# Patient Record
Sex: MALE | Race: WHITE | NOT HISPANIC OR LATINO | Employment: UNEMPLOYED | URBAN - METROPOLITAN AREA
[De-identification: names, ages, dates, MRNs, and addresses within clinical notes are randomized per-mention and may not be internally consistent; named-entity substitution may affect disease eponyms.]

---

## 2023-04-05 PROBLEM — J45.20 INTRINSIC ASTHMA WITHOUT STATUS ASTHMATICUS (HHS-HCC): Status: ACTIVE | Noted: 2018-03-23

## 2023-04-05 PROBLEM — E03.9 HYPOTHYROIDISM: Status: ACTIVE | Noted: 2019-04-09

## 2023-04-05 PROBLEM — Q05.9 SPINA BIFIDA (MULTI): Status: ACTIVE | Noted: 2021-06-10

## 2023-04-05 PROBLEM — K21.9 GASTROESOPHAGEAL REFLUX DISEASE: Status: ACTIVE | Noted: 2019-04-09

## 2023-04-06 ENCOUNTER — OFFICE VISIT (OUTPATIENT)
Dept: PRIMARY CARE | Facility: CLINIC | Age: 28
End: 2023-04-06
Payer: COMMERCIAL

## 2023-04-06 VITALS — SYSTOLIC BLOOD PRESSURE: 136 MMHG | HEART RATE: 76 BPM | DIASTOLIC BLOOD PRESSURE: 82 MMHG | TEMPERATURE: 97.2 F

## 2023-04-06 DIAGNOSIS — N31.9 NEUROGENIC DYSFUNCTION OF THE URINARY BLADDER: ICD-10-CM

## 2023-04-06 DIAGNOSIS — Q05.9 SPINA BIFIDA WITHOUT HYDROCEPHALUS, UNSPECIFIED SPINAL REGION (MULTI): Primary | ICD-10-CM

## 2023-04-06 PROBLEM — E11.319 ADVANCED DIABETIC RETINAL DISEASE (MULTI): Status: ACTIVE | Noted: 2023-04-06

## 2023-04-06 PROBLEM — Q05.8: Status: ACTIVE | Noted: 2023-04-06

## 2023-04-06 PROBLEM — N45.3 EPIDIDYMOORCHITIS: Status: ACTIVE | Noted: 2023-04-06

## 2023-04-06 PROCEDURE — 3075F SYST BP GE 130 - 139MM HG: CPT | Performed by: PHYSICIAN ASSISTANT

## 2023-04-06 PROCEDURE — 99214 OFFICE O/P EST MOD 30 MIN: CPT | Performed by: PHYSICIAN ASSISTANT

## 2023-04-06 PROCEDURE — 3079F DIAST BP 80-89 MM HG: CPT | Performed by: PHYSICIAN ASSISTANT

## 2023-04-06 PROCEDURE — 3008F BODY MASS INDEX DOCD: CPT | Performed by: PHYSICIAN ASSISTANT

## 2023-04-06 ASSESSMENT — ENCOUNTER SYMPTOMS
PALPITATIONS: 0
SHORTNESS OF BREATH: 0
ABDOMINAL PAIN: 0

## 2023-04-06 ASSESSMENT — PATIENT HEALTH QUESTIONNAIRE - PHQ9
1. LITTLE INTEREST OR PLEASURE IN DOING THINGS: NOT AT ALL
2. FEELING DOWN, DEPRESSED OR HOPELESS: NOT AT ALL
SUM OF ALL RESPONSES TO PHQ9 QUESTIONS 1 AND 2: 0

## 2023-04-06 NOTE — PROGRESS NOTES
Subjective   Patient ID: Alexis Ruiz is a 27 y.o. male who presents for Annual Exam (Needs medical supplies).    HPI     Patient presents for recheck of spina bifida and neurogenic bladder.      Here with his dad for recheck of congenital spina bifida and neurogenic bladder. Needs recertification for urinary supplies. Sees urologist Dr Stanley but hasn't been there since 11/2021.  Has had no changes. Still uses Gutierrez catheter with a leg bag 24/7. Since he is active and having to transport himself to and from his wheelchair he has a lot of the catheter tubes or bags break. Uses 14 bags and 4 catheters per month (as well as insertion supply trays) --father states that they know this exceeds insurance quantities but they just cash-pay the difference. Also needs supplies for changing them including syringe for filling catheter balloons. Father states that he will have orders sent from [employer] and today's OV notes can be used for auth of medical necessity through [employer]. Also needs recertification of need for disposable pull-on briefs incontinence pads and underpads (states they come from another company) - still good on supplies of these for now.  Uses these briefs/pads since he still leaks some urine.     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 Was seeing orthopedic Dr Guillaume and felt to be well healed. Was working with [employer] but decided to not get a prosthetic.     Was seeing wound clinic last  Fall for a pressure ulcer on buttock but patient states that that has healed.     Not on any medications -- refuses to take any.     No problems with his asthma or allergies in recent years.     Smoking cigs 1ppd.   Feels mood is doing well.   Review of Systems   Respiratory:  Negative for shortness of breath.    Cardiovascular:  Negative for chest pain and palpitations.   Gastrointestinal:  Negative for abdominal pain.       Objective   /82   Pulse 76   Temp 36.2 °C (97.2 °F)      Physical Exam  Constitutional:       Comments: In wheelchair. Partial right leg amputation.    HENT:      Head: Normocephalic.   Eyes:      General: No scleral icterus.  Cardiovascular:      Rate and Rhythm: Normal rate and regular rhythm.   Pulmonary:      Effort: Pulmonary effort is normal.      Breath sounds: Normal breath sounds.   Abdominal:      Palpations: Abdomen is soft. There is no mass.      Tenderness: There is no abdominal tenderness.   Skin:     General: Skin is warm and dry.   Neurological:      Mental Status: He is alert.   Psychiatric:         Mood and Affect: Affect normal.         Assessment/Plan   Diagnoses and all orders for this visit:  Spina bifida without hydrocephalus, unspecified spinal region (CMS/HCC)  Neurogenic dysfunction of the urinary bladder       It is medically necessary for patient to have incontinence supplies and anaya catheter supplies due to chronic hx of neurogenic bladder.   Father will request that [employer] send us their order form and will send back completed order. Encouraged to discontinue smoking. Stay active. Follow up as needed

## 2023-05-09 ENCOUNTER — TELEPHONE (OUTPATIENT)
Dept: PRIMARY CARE | Facility: CLINIC | Age: 28
End: 2023-05-09
Payer: COMMERCIAL

## 2023-05-09 ENCOUNTER — DOCUMENTATION (OUTPATIENT)
Dept: PRIMARY CARE | Facility: CLINIC | Age: 28
End: 2023-05-09
Payer: COMMERCIAL

## 2023-05-09 NOTE — TELEPHONE ENCOUNTER
----- Message from Abdoulaye Rivera RN sent at 5/9/2023 12:37 PM EDT -----  Regarding: unable to reach patient after 2 attempts  Discharge facility: [facility] diagnosis: Wound Check/AMA  Date of discharge:  8 May 23      Unsuccessful attempts x2 to reach patient for PCP Follow-up  Please have office staff reach out to patient and schedule an appointment within 7-13 days from discharge date.

## 2023-05-18 ENCOUNTER — APPOINTMENT (OUTPATIENT)
Dept: PRIMARY CARE | Facility: CLINIC | Age: 28
End: 2023-05-18
Payer: COMMERCIAL

## 2023-05-18 ASSESSMENT — ENCOUNTER SYMPTOMS
SHORTNESS OF BREATH: 0
PALPITATIONS: 0
ABDOMINAL PAIN: 0

## 2023-05-18 NOTE — PROGRESS NOTES
Subjective   Patient ID: Alexis Ruiz is a 27 y.o. male who presents for No chief complaint on file..    HPI   Patient presents for hospital follow up.    Admission date: 4/6/22  Discharge date:  4/6/22  Discharge diagnosis: abscess, Left ischial pressure sore, stage IV   Telephone outreach with patient after discharge: ***  Face-to-Face visit date: 05/18/23   Medical complexity decision-making: moderate  Reviewed discharge summary, lab/test results, and hospital records.  Reconciled med list.   Specialist follow-ups? ***            TCM complete: 9 May 23   Patient is NOT scheduled for a hospital follow up due to unsuccessful attempts to reach patient, task to office.   Patient discharged (8 May 23).   In order to bill as a TCM, the patient needs to be seen by (26 May 23).     Moderately complex & follow-up within 14 days- bill [id number] for hospital follow up.   Highly complex and follow-up visit within 7 days-can bill [id number] for hospital follow up     Review of Systems   Respiratory:  Negative for shortness of breath.    Cardiovascular:  Negative for chest pain and palpitations.   Gastrointestinal:  Negative for abdominal pain.         Objective   There were no vitals taken for this visit.    Physical Exam  Vitals and nursing note reviewed.   Constitutional:       Appearance: Normal appearance. He is well-developed.   Eyes:      General: No scleral icterus.  Cardiovascular:      Rate and Rhythm: Normal rate and regular rhythm.      Heart sounds: No murmur heard.  Pulmonary:      Effort: Pulmonary effort is normal.      Breath sounds: Normal breath sounds.   Abdominal:      Palpations: Abdomen is soft. There is no mass.      Tenderness: There is no abdominal tenderness.   Musculoskeletal:      Cervical back: Neck supple.   Skin:     General: Skin is warm and dry.   Neurological:      Mental Status: He is alert.           Assessment/Plan   There are no diagnoses linked to this encounter.

## 2023-05-20 ENCOUNTER — HOSPITAL ENCOUNTER (INPATIENT)
Age: 28
LOS: 1 days | Discharge: HOME HEALTH | DRG: 602 | End: 2023-05-24
Attending: EMERGENCY MEDICINE | Admitting: INTERNAL MEDICINE
Payer: MEDICARE

## 2023-05-20 DIAGNOSIS — L02.31 GLUTEAL ABSCESS: ICD-10-CM

## 2023-05-20 DIAGNOSIS — L89.323 PRESSURE INJURY OF LEFT BUTTOCK, STAGE 3 (HCC): Primary | ICD-10-CM

## 2023-05-20 DIAGNOSIS — T14.8XXA WOUND, OPEN: ICD-10-CM

## 2023-05-20 PROCEDURE — 96367 TX/PROPH/DG ADDL SEQ IV INF: CPT

## 2023-05-20 PROCEDURE — 96365 THER/PROPH/DIAG IV INF INIT: CPT

## 2023-05-20 PROCEDURE — 87040 BLOOD CULTURE FOR BACTERIA: CPT

## 2023-05-20 PROCEDURE — 83605 ASSAY OF LACTIC ACID: CPT

## 2023-05-20 PROCEDURE — 85651 RBC SED RATE NONAUTOMATED: CPT

## 2023-05-20 PROCEDURE — 87186 SC STD MICRODIL/AGAR DIL: CPT

## 2023-05-20 PROCEDURE — 85025 COMPLETE CBC W/AUTO DIFF WBC: CPT

## 2023-05-20 PROCEDURE — 87075 CULTR BACTERIA EXCEPT BLOOD: CPT

## 2023-05-20 PROCEDURE — 80053 COMPREHEN METABOLIC PANEL: CPT

## 2023-05-20 PROCEDURE — 87070 CULTURE OTHR SPECIMN AEROBIC: CPT

## 2023-05-20 PROCEDURE — 86140 C-REACTIVE PROTEIN: CPT

## 2023-05-20 PROCEDURE — 99285 EMERGENCY DEPT VISIT HI MDM: CPT

## 2023-05-20 PROCEDURE — 87077 CULTURE AEROBIC IDENTIFY: CPT

## 2023-05-20 RX ORDER — SODIUM CHLORIDE 9 MG/ML
INJECTION, SOLUTION INTRAVENOUS PRN
Status: DISCONTINUED | OUTPATIENT
Start: 2023-05-20 | End: 2023-05-24 | Stop reason: HOSPADM

## 2023-05-20 RX ORDER — SODIUM CHLORIDE 0.9 % (FLUSH) 0.9 %
5-40 SYRINGE (ML) INJECTION EVERY 12 HOURS SCHEDULED
Status: DISCONTINUED | OUTPATIENT
Start: 2023-05-20 | End: 2023-05-24 | Stop reason: HOSPADM

## 2023-05-20 RX ORDER — SODIUM CHLORIDE 0.9 % (FLUSH) 0.9 %
5-40 SYRINGE (ML) INJECTION PRN
Status: DISCONTINUED | OUTPATIENT
Start: 2023-05-20 | End: 2023-05-24 | Stop reason: HOSPADM

## 2023-05-20 ASSESSMENT — LIFESTYLE VARIABLES
HOW MANY STANDARD DRINKS CONTAINING ALCOHOL DO YOU HAVE ON A TYPICAL DAY: PATIENT DOES NOT DRINK
HOW OFTEN DO YOU HAVE A DRINK CONTAINING ALCOHOL: NEVER

## 2023-05-20 ASSESSMENT — PAIN - FUNCTIONAL ASSESSMENT: PAIN_FUNCTIONAL_ASSESSMENT: NONE - DENIES PAIN

## 2023-05-21 ENCOUNTER — APPOINTMENT (OUTPATIENT)
Dept: CT IMAGING | Age: 28
DRG: 602 | End: 2023-05-21
Payer: MEDICARE

## 2023-05-21 PROBLEM — F90.2 ATTENTION DEFICIT HYPERACTIVITY DISORDER (ADHD), COMBINED TYPE: Status: ACTIVE | Noted: 2023-05-21

## 2023-05-21 PROBLEM — Z97.8 CHRONIC INDWELLING FOLEY CATHETER: Status: ACTIVE | Noted: 2023-05-21

## 2023-05-21 PROBLEM — N31.9 NEUROGENIC BLADDER: Status: ACTIVE | Noted: 2023-05-21

## 2023-05-21 PROBLEM — T14.8XXA WOUND, OPEN: Status: ACTIVE | Noted: 2023-05-21

## 2023-05-21 PROBLEM — K59.2 NEUROGENIC BOWEL: Status: ACTIVE | Noted: 2023-05-21

## 2023-05-21 PROBLEM — E03.9 ACQUIRED HYPOTHYROIDISM: Status: ACTIVE | Noted: 2023-05-21

## 2023-05-21 PROBLEM — L89.323 PRESSURE INJURY OF LEFT BUTTOCK, STAGE 3 (HCC): Status: ACTIVE | Noted: 2023-05-21

## 2023-05-21 PROBLEM — Q05.7 SPINA BIFIDA OF LUMBAR REGION WITHOUT HYDROCEPHALUS (HCC): Status: ACTIVE | Noted: 2023-05-21

## 2023-05-21 PROBLEM — F31.70 BIPOLAR AFFECTIVE DISORDER IN REMISSION (HCC): Status: ACTIVE | Noted: 2023-05-21

## 2023-05-21 PROBLEM — K59.09 OTHER CONSTIPATION: Status: ACTIVE | Noted: 2023-05-21

## 2023-05-21 PROBLEM — L89.324 PRESSURE INJURY OF LEFT BUTTOCK, STAGE 4 (HCC): Status: ACTIVE | Noted: 2023-05-21

## 2023-05-21 LAB
ALBUMIN SERPL-MCNC: 3.8 G/DL (ref 3.5–5.2)
ALP SERPL-CCNC: 80 U/L (ref 40–129)
ALT SERPL-CCNC: 7 U/L (ref 0–40)
ANION GAP SERPL CALCULATED.3IONS-SCNC: 10 MMOL/L (ref 7–16)
AST SERPL-CCNC: 12 U/L (ref 0–39)
BACTERIA URNS QL MICRO: ABNORMAL /HPF
BASOPHILS # BLD: 0.12 E9/L (ref 0–0.2)
BASOPHILS NFR BLD: 1.3 % (ref 0–2)
BILIRUB SERPL-MCNC: <0.2 MG/DL (ref 0–1.2)
BILIRUB UR QL STRIP: NEGATIVE
BUN SERPL-MCNC: 8 MG/DL (ref 6–20)
CALCIUM SERPL-MCNC: 9.4 MG/DL (ref 8.6–10.2)
CHLORIDE SERPL-SCNC: 105 MMOL/L (ref 98–107)
CLARITY UR: ABNORMAL
CO2 SERPL-SCNC: 29 MMOL/L (ref 22–29)
COLOR UR: YELLOW
CREAT SERPL-MCNC: 0.7 MG/DL (ref 0.7–1.2)
CRP SERPL HS-MCNC: 1.4 MG/DL (ref 0–0.4)
EOSINOPHIL # BLD: 0.26 E9/L (ref 0.05–0.5)
EOSINOPHIL NFR BLD: 2.9 % (ref 0–6)
EPI CELLS #/AREA URNS HPF: ABNORMAL /HPF
ERYTHROCYTE [DISTWIDTH] IN BLOOD BY AUTOMATED COUNT: 14.6 FL (ref 11.5–15)
ERYTHROCYTE [SEDIMENTATION RATE] IN BLOOD BY WESTERGREN METHOD: 27 MM/HR (ref 0–15)
GLUCOSE SERPL-MCNC: 79 MG/DL (ref 74–99)
GLUCOSE UR STRIP-MCNC: NEGATIVE MG/DL
HCT VFR BLD AUTO: 45.7 % (ref 37–54)
HGB BLD-MCNC: 13.9 G/DL (ref 12.5–16.5)
HGB UR QL STRIP: ABNORMAL
IMM GRANULOCYTES # BLD: 0.05 E9/L
IMM GRANULOCYTES NFR BLD: 0.6 % (ref 0–5)
KETONES UR STRIP-MCNC: NEGATIVE MG/DL
LACTATE BLDV-SCNC: 1.9 MMOL/L (ref 0.5–1.9)
LEUKOCYTE ESTERASE UR QL STRIP: ABNORMAL
LYMPHOCYTES # BLD: 1.87 E9/L (ref 1.5–4)
LYMPHOCYTES NFR BLD: 20.8 % (ref 20–42)
MCH RBC QN AUTO: 23.6 PG (ref 26–35)
MCHC RBC AUTO-ENTMCNC: 30.4 % (ref 32–34.5)
MCV RBC AUTO: 77.5 FL (ref 80–99.9)
MONOCYTES # BLD: 0.51 E9/L (ref 0.1–0.95)
MONOCYTES NFR BLD: 5.7 % (ref 2–12)
NEUTROPHILS # BLD: 6.18 E9/L (ref 1.8–7.3)
NEUTS SEG NFR BLD: 68.7 % (ref 43–80)
NITRITE UR QL STRIP: POSITIVE
PH UR STRIP: 6 [PH] (ref 5–9)
PLATELET # BLD AUTO: 576 E9/L (ref 130–450)
PMV BLD AUTO: 8.9 FL (ref 7–12)
POTASSIUM SERPL-SCNC: 4.4 MMOL/L (ref 3.5–5)
PROT SERPL-MCNC: 7.7 G/DL (ref 6.4–8.3)
PROT UR STRIP-MCNC: 30 MG/DL
RBC # BLD AUTO: 5.9 E12/L (ref 3.8–5.8)
RBC #/AREA URNS HPF: ABNORMAL /HPF (ref 0–2)
SODIUM SERPL-SCNC: 144 MMOL/L (ref 132–146)
SP GR UR STRIP: >=1.03 (ref 1–1.03)
UROBILINOGEN UR STRIP-ACNC: 0.2 E.U./DL
WBC # BLD: 9 E9/L (ref 4.5–11.5)
WBC #/AREA URNS HPF: ABNORMAL /HPF (ref 0–5)

## 2023-05-21 PROCEDURE — G0378 HOSPITAL OBSERVATION PER HR: HCPCS

## 2023-05-21 PROCEDURE — 99221 1ST HOSP IP/OBS SF/LOW 40: CPT | Performed by: SURGERY

## 2023-05-21 PROCEDURE — 2580000003 HC RX 258: Performed by: FAMILY MEDICINE

## 2023-05-21 PROCEDURE — 81001 URINALYSIS AUTO W/SCOPE: CPT

## 2023-05-21 PROCEDURE — 2580000003 HC RX 258: Performed by: STUDENT IN AN ORGANIZED HEALTH CARE EDUCATION/TRAINING PROGRAM

## 2023-05-21 PROCEDURE — 2580000003 HC RX 258: Performed by: SPECIALIST

## 2023-05-21 PROCEDURE — 2500000003 HC RX 250 WO HCPCS: Performed by: STUDENT IN AN ORGANIZED HEALTH CARE EDUCATION/TRAINING PROGRAM

## 2023-05-21 PROCEDURE — 6360000004 HC RX CONTRAST MEDICATION: Performed by: RADIOLOGY

## 2023-05-21 PROCEDURE — 6360000002 HC RX W HCPCS: Performed by: STUDENT IN AN ORGANIZED HEALTH CARE EDUCATION/TRAINING PROGRAM

## 2023-05-21 PROCEDURE — 74177 CT ABD & PELVIS W/CONTRAST: CPT

## 2023-05-21 PROCEDURE — 99222 1ST HOSP IP/OBS MODERATE 55: CPT | Performed by: FAMILY MEDICINE

## 2023-05-21 PROCEDURE — 6360000002 HC RX W HCPCS: Performed by: SPECIALIST

## 2023-05-21 RX ORDER — SODIUM CHLORIDE 9 MG/ML
INJECTION, SOLUTION INTRAVENOUS PRN
Status: DISCONTINUED | OUTPATIENT
Start: 2023-05-21 | End: 2023-05-24 | Stop reason: HOSPADM

## 2023-05-21 RX ORDER — CLINDAMYCIN PHOSPHATE 900 MG/50ML
900 INJECTION INTRAVENOUS ONCE
Status: COMPLETED | OUTPATIENT
Start: 2023-05-21 | End: 2023-05-21

## 2023-05-21 RX ORDER — SODIUM CHLORIDE 0.9 % (FLUSH) 0.9 %
5-40 SYRINGE (ML) INJECTION PRN
Status: DISCONTINUED | OUTPATIENT
Start: 2023-05-21 | End: 2023-05-24 | Stop reason: HOSPADM

## 2023-05-21 RX ORDER — ACETAMINOPHEN 650 MG/1
650 SUPPOSITORY RECTAL EVERY 6 HOURS PRN
Status: DISCONTINUED | OUTPATIENT
Start: 2023-05-21 | End: 2023-05-24 | Stop reason: HOSPADM

## 2023-05-21 RX ORDER — SODIUM CHLORIDE 0.9 % (FLUSH) 0.9 %
5-40 SYRINGE (ML) INJECTION EVERY 12 HOURS SCHEDULED
Status: DISCONTINUED | OUTPATIENT
Start: 2023-05-21 | End: 2023-05-24 | Stop reason: HOSPADM

## 2023-05-21 RX ORDER — BISACODYL 10 MG
10 SUPPOSITORY, RECTAL RECTAL PRN
Status: DISCONTINUED | OUTPATIENT
Start: 2023-05-21 | End: 2023-05-24 | Stop reason: HOSPADM

## 2023-05-21 RX ORDER — 0.9 % SODIUM CHLORIDE 0.9 %
1000 INTRAVENOUS SOLUTION INTRAVENOUS ONCE
Status: COMPLETED | OUTPATIENT
Start: 2023-05-21 | End: 2023-05-21

## 2023-05-21 RX ORDER — ACETAMINOPHEN 325 MG/1
650 TABLET ORAL EVERY 6 HOURS PRN
Status: DISCONTINUED | OUTPATIENT
Start: 2023-05-21 | End: 2023-05-24 | Stop reason: HOSPADM

## 2023-05-21 RX ADMIN — SODIUM CHLORIDE 1000 ML: 9 INJECTION, SOLUTION INTRAVENOUS at 03:02

## 2023-05-21 RX ADMIN — IOPAMIDOL 75 ML: 755 INJECTION, SOLUTION INTRAVENOUS at 01:03

## 2023-05-21 RX ADMIN — SODIUM CHLORIDE, PRESERVATIVE FREE 10 ML: 5 INJECTION INTRAVENOUS at 20:12

## 2023-05-21 RX ADMIN — Medication 5 ML: at 00:10

## 2023-05-21 RX ADMIN — CLINDAMYCIN PHOSPHATE 900 MG: 900 INJECTION, SOLUTION INTRAVENOUS at 03:45

## 2023-05-21 RX ADMIN — PIPERACILLIN AND TAZOBACTAM 4500 MG: 4; .5 INJECTION, POWDER, LYOPHILIZED, FOR SOLUTION INTRAVENOUS at 03:03

## 2023-05-21 RX ADMIN — PIPERACILLIN AND TAZOBACTAM 3375 MG: 3; .375 INJECTION, POWDER, LYOPHILIZED, FOR SOLUTION INTRAVENOUS at 20:16

## 2023-05-21 RX ADMIN — VANCOMYCIN HYDROCHLORIDE 1500 MG: 10 INJECTION, POWDER, LYOPHILIZED, FOR SOLUTION INTRAVENOUS at 04:56

## 2023-05-21 RX ADMIN — PIPERACILLIN AND TAZOBACTAM 3375 MG: 3; .375 INJECTION, POWDER, LYOPHILIZED, FOR SOLUTION INTRAVENOUS at 14:08

## 2023-05-22 LAB
ANION GAP SERPL CALCULATED.3IONS-SCNC: 9 MMOL/L (ref 7–16)
BASOPHILS # BLD: 0.11 E9/L (ref 0–0.2)
BASOPHILS NFR BLD: 1.4 % (ref 0–2)
BUN SERPL-MCNC: 8 MG/DL (ref 6–20)
CALCIUM SERPL-MCNC: 9.2 MG/DL (ref 8.6–10.2)
CHLORIDE SERPL-SCNC: 103 MMOL/L (ref 98–107)
CO2 SERPL-SCNC: 28 MMOL/L (ref 22–29)
CREAT SERPL-MCNC: 0.7 MG/DL (ref 0.7–1.2)
EOSINOPHIL # BLD: 0.43 E9/L (ref 0.05–0.5)
EOSINOPHIL NFR BLD: 5.6 % (ref 0–6)
ERYTHROCYTE [DISTWIDTH] IN BLOOD BY AUTOMATED COUNT: 14.6 FL (ref 11.5–15)
GLUCOSE SERPL-MCNC: 73 MG/DL (ref 74–99)
HCT VFR BLD AUTO: 42.7 % (ref 37–54)
HGB BLD-MCNC: 12.7 G/DL (ref 12.5–16.5)
IMM GRANULOCYTES # BLD: 0.04 E9/L
IMM GRANULOCYTES NFR BLD: 0.5 % (ref 0–5)
LYMPHOCYTES # BLD: 2.62 E9/L (ref 1.5–4)
LYMPHOCYTES NFR BLD: 34.4 % (ref 20–42)
MCH RBC QN AUTO: 23.7 PG (ref 26–35)
MCHC RBC AUTO-ENTMCNC: 29.7 % (ref 32–34.5)
MCV RBC AUTO: 79.7 FL (ref 80–99.9)
MONOCYTES # BLD: 0.59 E9/L (ref 0.1–0.95)
MONOCYTES NFR BLD: 7.7 % (ref 2–12)
NEUTROPHILS # BLD: 3.83 E9/L (ref 1.8–7.3)
NEUTS SEG NFR BLD: 50.4 % (ref 43–80)
PLATELET # BLD AUTO: 375 E9/L (ref 130–450)
PMV BLD AUTO: 10.7 FL (ref 7–12)
POTASSIUM SERPL-SCNC: 4.1 MMOL/L (ref 3.5–5)
RBC # BLD AUTO: 5.36 E12/L (ref 3.8–5.8)
SODIUM SERPL-SCNC: 140 MMOL/L (ref 132–146)
WBC # BLD: 7.6 E9/L (ref 4.5–11.5)

## 2023-05-22 PROCEDURE — 85025 COMPLETE CBC W/AUTO DIFF WBC: CPT

## 2023-05-22 PROCEDURE — 2580000003 HC RX 258: Performed by: SPECIALIST

## 2023-05-22 PROCEDURE — 80048 BASIC METABOLIC PNL TOTAL CA: CPT

## 2023-05-22 PROCEDURE — 6360000002 HC RX W HCPCS: Performed by: SPECIALIST

## 2023-05-22 PROCEDURE — 99232 SBSQ HOSP IP/OBS MODERATE 35: CPT | Performed by: INTERNAL MEDICINE

## 2023-05-22 PROCEDURE — 36415 COLL VENOUS BLD VENIPUNCTURE: CPT

## 2023-05-22 PROCEDURE — G0378 HOSPITAL OBSERVATION PER HR: HCPCS

## 2023-05-22 RX ADMIN — PIPERACILLIN AND TAZOBACTAM 3375 MG: 3; .375 INJECTION, POWDER, LYOPHILIZED, FOR SOLUTION INTRAVENOUS at 22:04

## 2023-05-22 RX ADMIN — PIPERACILLIN AND TAZOBACTAM 3375 MG: 3; .375 INJECTION, POWDER, LYOPHILIZED, FOR SOLUTION INTRAVENOUS at 06:40

## 2023-05-22 RX ADMIN — PIPERACILLIN AND TAZOBACTAM 3375 MG: 3; .375 INJECTION, POWDER, LYOPHILIZED, FOR SOLUTION INTRAVENOUS at 14:18

## 2023-05-22 NOTE — PLAN OF CARE
Problem: Discharge Planning  Goal: Discharge to home or other facility with appropriate resources  5/22/2023 0851 by Oma Bae RN  Outcome: Progressing     Problem: Risk for Elopement  Goal: Patient will not exit the unit/facility without proper excort  5/22/2023 0851 by Oma Bae RN  Outcome: Progressing     Problem: Safety - Adult  Goal: Free from fall injury  5/22/2023 0851 by Oma Bae RN  Outcome: Progressing     Problem: ABCDS Injury Assessment  Goal: Absence of physical injury  5/22/2023 0851 by Oma Bae RN  Outcome: Progressing     Problem: Skin/Tissue Integrity  Goal: Absence of new skin breakdown  Description: 1. Monitor for areas of redness and/or skin breakdown  2. Assess vascular access sites hourly  3. Every 4-6 hours minimum:  Change oxygen saturation probe site  4. Every 4-6 hours:  If on nasal continuous positive airway pressure, respiratory therapy assess nares and determine need for appliance change or resting period.   5/22/2023 0851 by Oma Bae RN  Outcome: Progressing     Problem: Skin/Tissue Integrity - Adult  Goal: Incisions, wounds, or drain sites healing without S/S of infection  5/22/2023 0851 by Oma Bae RN  Outcome: Progressing

## 2023-05-22 NOTE — PLAN OF CARE
Problem: Discharge Planning  Goal: Discharge to home or other facility with appropriate resources  5/21/2023 2252 by Lynn Lange RN  Outcome: Progressing     Problem: Risk for Elopement  Goal: Patient will not exit the unit/facility without proper excort  5/21/2023 2252 by Lynn Lange RN  Outcome: Progressing     Problem: Safety - Adult  Goal: Free from fall injury  5/21/2023 2252 by Lynn Lange RN  Outcome: Progressing     Problem: ABCDS Injury Assessment  Goal: Absence of physical injury  5/21/2023 2252 by Lynn Lange RN  Outcome: Progressing     Problem: Skin/Tissue Integrity  Goal: Absence of new skin breakdown  Description: 1. Monitor for areas of redness and/or skin breakdown  2. Assess vascular access sites hourly  3. Every 4-6 hours minimum:  Change oxygen saturation probe site  4. Every 4-6 hours:  If on nasal continuous positive airway pressure, respiratory therapy assess nares and determine need for appliance change or resting period.   5/21/2023 2252 by Lynn Lange RN  Outcome: Progressing     Problem: Skin/Tissue Integrity - Adult  Goal: Incisions, wounds, or drain sites healing without S/S of infection  5/21/2023 2252 by Lynn Lange RN  Outcome: Progressing

## 2023-05-23 LAB — BACTERIA SPEC ANAEROBE CULT: NORMAL

## 2023-05-23 PROCEDURE — G0378 HOSPITAL OBSERVATION PER HR: HCPCS

## 2023-05-23 PROCEDURE — 2580000003 HC RX 258: Performed by: FAMILY MEDICINE

## 2023-05-23 PROCEDURE — 2580000003 HC RX 258: Performed by: STUDENT IN AN ORGANIZED HEALTH CARE EDUCATION/TRAINING PROGRAM

## 2023-05-23 PROCEDURE — 6370000000 HC RX 637 (ALT 250 FOR IP): Performed by: INTERNAL MEDICINE

## 2023-05-23 PROCEDURE — 6360000002 HC RX W HCPCS: Performed by: SPECIALIST

## 2023-05-23 PROCEDURE — 1200000000 HC SEMI PRIVATE

## 2023-05-23 PROCEDURE — 99232 SBSQ HOSP IP/OBS MODERATE 35: CPT | Performed by: INTERNAL MEDICINE

## 2023-05-23 PROCEDURE — 2580000003 HC RX 258: Performed by: SPECIALIST

## 2023-05-23 RX ORDER — NICOTINE 21 MG/24HR
1 PATCH, TRANSDERMAL 24 HOURS TRANSDERMAL DAILY
Status: DISCONTINUED | OUTPATIENT
Start: 2023-05-23 | End: 2023-05-24 | Stop reason: HOSPADM

## 2023-05-23 RX ORDER — CALCIUM CARBONATE 500 MG/1
500 TABLET, CHEWABLE ORAL 3 TIMES DAILY PRN
Status: DISCONTINUED | OUTPATIENT
Start: 2023-05-23 | End: 2023-05-24 | Stop reason: HOSPADM

## 2023-05-23 RX ADMIN — PIPERACILLIN AND TAZOBACTAM 3375 MG: 3; .375 INJECTION, POWDER, LYOPHILIZED, FOR SOLUTION INTRAVENOUS at 14:16

## 2023-05-23 RX ADMIN — PIPERACILLIN AND TAZOBACTAM 3375 MG: 3; .375 INJECTION, POWDER, LYOPHILIZED, FOR SOLUTION INTRAVENOUS at 05:30

## 2023-05-23 RX ADMIN — Medication 10 ML: at 20:59

## 2023-05-23 RX ADMIN — CALCIUM CARBONATE (ANTACID) CHEW TAB 500 MG 500 MG: 500 CHEW TAB at 14:12

## 2023-05-23 RX ADMIN — PIPERACILLIN AND TAZOBACTAM 3375 MG: 3; .375 INJECTION, POWDER, LYOPHILIZED, FOR SOLUTION INTRAVENOUS at 20:58

## 2023-05-23 RX ADMIN — SODIUM CHLORIDE, PRESERVATIVE FREE 10 ML: 5 INJECTION INTRAVENOUS at 20:59

## 2023-05-23 NOTE — PLAN OF CARE
Problem: Discharge Planning  Goal: Discharge to home or other facility with appropriate resources  Outcome: Progressing     Problem: Risk for Elopement  Goal: Patient will not exit the unit/facility without proper excort  Outcome: Progressing     Problem: Safety - Adult  Goal: Free from fall injury  Outcome: Progressing     Problem: ABCDS Injury Assessment  Goal: Absence of physical injury  Outcome: Progressing     Problem: Skin/Tissue Integrity  Goal: Absence of new skin breakdown  Description: 1. Monitor for areas of redness and/or skin breakdown  2. Assess vascular access sites hourly  3. Every 4-6 hours minimum:  Change oxygen saturation probe site  4. Every 4-6 hours:  If on nasal continuous positive airway pressure, respiratory therapy assess nares and determine need for appliance change or resting period.   Outcome: Progressing     Problem: Skin/Tissue Integrity - Adult  Goal: Incisions, wounds, or drain sites healing without S/S of infection  Outcome: Progressing

## 2023-05-24 ENCOUNTER — TELEPHONE (OUTPATIENT)
Dept: PRIMARY CARE | Facility: CLINIC | Age: 28
End: 2023-05-24
Payer: COMMERCIAL

## 2023-05-24 VITALS
OXYGEN SATURATION: 98 % | TEMPERATURE: 98 F | DIASTOLIC BLOOD PRESSURE: 61 MMHG | RESPIRATION RATE: 18 BRPM | BODY MASS INDEX: 22.65 KG/M2 | HEART RATE: 78 BPM | HEIGHT: 74 IN | SYSTOLIC BLOOD PRESSURE: 106 MMHG | WEIGHT: 176.5 LBS

## 2023-05-24 LAB
BACTERIA WND AEROBE CULT: ABNORMAL
BACTERIA WND AEROBE CULT: ABNORMAL
ORGANISM: ABNORMAL
ORGANISM: ABNORMAL

## 2023-05-24 PROCEDURE — 6370000000 HC RX 637 (ALT 250 FOR IP): Performed by: CLINICAL NURSE SPECIALIST

## 2023-05-24 PROCEDURE — 2580000003 HC RX 258: Performed by: SPECIALIST

## 2023-05-24 PROCEDURE — 6370000000 HC RX 637 (ALT 250 FOR IP): Performed by: INTERNAL MEDICINE

## 2023-05-24 PROCEDURE — 6360000002 HC RX W HCPCS: Performed by: SPECIALIST

## 2023-05-24 PROCEDURE — 99232 SBSQ HOSP IP/OBS MODERATE 35: CPT | Performed by: INTERNAL MEDICINE

## 2023-05-24 RX ORDER — CEFDINIR 300 MG/1
300 CAPSULE ORAL EVERY 12 HOURS SCHEDULED
Qty: 42 CAPSULE | Refills: 0 | Status: SHIPPED | OUTPATIENT
Start: 2023-05-24 | End: 2023-06-14

## 2023-05-24 RX ORDER — NICOTINE 21 MG/24HR
1 PATCH, TRANSDERMAL 24 HOURS TRANSDERMAL DAILY
Qty: 30 PATCH | Refills: 0 | Status: SHIPPED | OUTPATIENT
Start: 2023-05-25

## 2023-05-24 RX ORDER — DOXYCYCLINE HYCLATE 100 MG/1
100 CAPSULE ORAL EVERY 12 HOURS SCHEDULED
Status: DISCONTINUED | OUTPATIENT
Start: 2023-05-24 | End: 2023-05-24 | Stop reason: HOSPADM

## 2023-05-24 RX ORDER — CEFDINIR 300 MG/1
300 CAPSULE ORAL EVERY 12 HOURS SCHEDULED
Status: DISCONTINUED | OUTPATIENT
Start: 2023-05-24 | End: 2023-05-24 | Stop reason: HOSPADM

## 2023-05-24 RX ORDER — DOXYCYCLINE HYCLATE 100 MG
100 TABLET ORAL EVERY 12 HOURS SCHEDULED
Qty: 42 TABLET | Refills: 0 | Status: SHIPPED | OUTPATIENT
Start: 2023-05-24 | End: 2023-06-14

## 2023-05-24 RX ADMIN — DOXYCYCLINE HYCLATE 100 MG: 100 CAPSULE ORAL at 12:46

## 2023-05-24 RX ADMIN — PIPERACILLIN AND TAZOBACTAM 3375 MG: 3; .375 INJECTION, POWDER, LYOPHILIZED, FOR SOLUTION INTRAVENOUS at 05:22

## 2023-05-24 RX ADMIN — CEFDINIR 300 MG: 300 CAPSULE ORAL at 12:46

## 2023-05-24 NOTE — PLAN OF CARE
Problem: Safety - Adult  Goal: Free from fall injury  5/24/2023 1242 by Tim Tracy RN  Outcome: Progressing     Problem: Skin/Tissue Integrity  Goal: Absence of new skin breakdown  Description: 1. Monitor for areas of redness and/or skin breakdown  2. Assess vascular access sites hourly  3. Every 4-6 hours minimum:  Change oxygen saturation probe site  4. Every 4-6 hours:  If on nasal continuous positive airway pressure, respiratory therapy assess nares and determine need for appliance change or resting period.   5/24/2023 1242 by Tim Tracy, RN  Outcome: Progressing

## 2023-05-24 NOTE — TELEPHONE ENCOUNTER
Ayala called from [facility] asking if [facility] will follow for Skilled Nursing and HHC? Please advise

## 2023-05-24 NOTE — FLOWSHEET NOTE
Inpatient Wound Care    Admit Date: 5/20/2023 11:25 PM    Reason for consult:  left Ischial right stump    Significant history:    Past Medical History:   Diagnosis Date    Spina bifida ([address]        Wound history:      Findings:     05/23/23 1150   Wound 05/21/23 Ischium Left   Date First Assessed/Time First Assessed: 05/21/23 0643   Present on Hospital Admission: Yes  Primary Wound Type: Pressure Injury  Location: Ischium  Wound Location Orientation: Left   Wound Image    Wound Etiology Pressure Stage 3   Wound Cleansed Irrigated with saline   Dressing/Treatment Alginate   Wound Length (cm) 2.5 cm   Wound Width (cm) 2.5 cm   Wound Surface Area (cm^2) 6.25 cm^2   Change in Wound Size % (l*w) 0   Wound Assessment Pink/red   Drainage Amount Small   Odor None   Clarisa-wound Assessment Intact   Wound 05/24/23 Pretibial Proximal;Right   Date First Assessed/Time First Assessed: 05/24/23 0920   Present on Hospital Admission: Yes  Location: Pretibial  Wound Location Orientation: Proximal;Right   Wound Image    Wound Cleansed Cleansed with saline   Wound Assessment Eschar dry   Drainage Amount None   Odor None       **Informed Consent**    The patient has given verbal consent to have photos taken of right stump  left ischial and inserted into their chart as part of their permanent medical record for purposes of documentation, treatment management and/or medical review. All Images taken on 5/24/23 of patient name: Topher Durbin were transmitted and stored on secured [employer] located within [facility] by a registered [employer] Mobile Application Device.         Impression:  left ischial stage 4right stump covered with eschar    Interventions in place:  left ischial stage 3    Plan:  Left ischial using opticell Helene Siemens, RN 5/24/2023 9:22 AM

## 2023-05-24 NOTE — DISCHARGE SUMMARY
[facility] Physician Discharge Summary       Sue Christie PA-C  [address]  [facility] [phone]    Schedule an appointment as soon as possible for a visit in 2 week(s)      [place] Gonsalez MD  [address] [address] [facility]  [phone]    Schedule an appointment as soon as possible for a visit in 2 week(s)        Activity level: Slowly increase as tolerated    Diet: ADULT DIET; Regular    Labs: None are pending at the discharge    Condition at discharge: Stable    Dispo: Return to home setting     Patient ID:  Marifer Dooley  [id number]  20 y.o.  1995    Admit date: 5/20/2023    Discharge date and time:  5/24/2023  1:50 PM    Admission Diagnoses: Principal Problem:    Wound, open  Active Problems:    Pressure injury of left buttock, stage 4 (HCC)    Spina bifida of lumbar region without hydrocephalus ([address]    Other constipation    Neurogenic bowel    Neurogenic bladder    Chronic indwelling Razo catheter    Attention deficit hyperactivity disorder (ADHD), combined type    Bipolar affective disorder in remission ([address]    Acquired hypothyroidism    Pressure injury of left buttock, stage 3 ([address]  Resolved Problems:    * No resolved hospital problems. *      Discharge Diagnoses: Principal Problem:    Wound, open  Active Problems:    Pressure injury of left buttock, stage 4 (HCC)    Spina bifida of lumbar region without hydrocephalus (HCC)    Other constipation    Neurogenic bowel    Neurogenic bladder    Chronic indwelling Razo catheter    Attention deficit hyperactivity disorder (ADHD), combined type    Bipolar affective disorder in remission ([address]    Acquired hypothyroidism    Pressure injury of left buttock, stage 3 ([address]  Resolved Problems:    * No resolved hospital problems.  *      Consults:  IP CONSULT TO GENERAL SURGERY  IP CONSULT TO GENERAL SURGERY  IP CONSULT TO INFECTIOUS DISEASES  IP CONSULT TO SOCIAL

## 2023-05-24 NOTE — CARE COORDINATION
5-23-Cm note: [facility] has accepted [place] for [place] care at [place], will need home care orders when final needs are known, pt may require IV Abx's, pending wound cx's  ,  called [employer] to follow for possible IV abx's, left message on secure line. cm following .  Electronically signed by Elvis Ring RN on 5/23/2023 at 3:03 PM
5-23-Cm note: pt agreeable to home health care , cm looking for home care company in network with pt's insurance, several calls made, at this time [address] is looking at him, will await response, cm watching for possible IV Abx's at home. Pt's father Grisel Stephen will be there to assist with the home care needs.  Electronically signed by Mihir Ledbetter RN on 5/23/2023 at 2:20 PM
5-24-Cm note: pt dcing on oral abx's , placed call to [facility] care , they will call pt with visit time for wound care/teaching tomorrow. Pt's father will provide transport home, pt was ordered a cushion for his wheelchair (ROHO cushion), called [employer] [phone], faxed face sheet, orders and wound measurements to them , they will get in touch with pt when they are ready to deliver, pt aware.  Electronically signed by Ishaan Hinds RN on 5/24/2023 at 3:05 PM
achievement of predicted outcomes: Family support    Barriers to discharge: Wound Care- has a wound vac at home     Additional Case Management Notes: 5-22-Cm note: (PLEASE SEE CM FYI NOTE from 5-22)  met with pt for transition of care, pt is wheelchair bound from [facility] , pt lives in a handicapped accessible home , he has [facility]d [facility] homecare in the past however they do not come to [facility], pt may need home care for wound care, he has not given a choice of companies at this time , pt denies any needs for home , his father assists him if needed. Cm following for possible IV ABX's at [place], pt plans on home with no needs. Electronically signed by Alisa Calderon RN on 5/22/2023 at 4:10 PM      The Plan for Transition of Care is related to the following treatment goals of Gluteal abscess [L02.31]  Wound, open [T14. 8XXA]  Pressure injury of left buttock, stage 3 ([address] [facility], RN  Case Management Department  Ph: [phone] Fax: [phone]

## 2023-05-24 NOTE — DISCHARGE INSTRUCTIONS
Your information:  Name: Isreal Aceves  : 1995    Your instructions:    Discharge home with home care. They will call you prior to your first visit. Follow up with primary care provider and specialists as directed. Have labs drawn on May 31, 2022. Wound care information:  Left ischial wound cleanse with normal saline   Apply opticell and over dressing   Change daily    Signs and symptoms to look out for:  Call your doctor now or seek immediate medical care if:    You have signs of infection, such as: Increased pain, swelling, warmth, or redness. Red streaks leading from the sore. Pus draining from the sore. A fever. Watch closely for changes in your health, and be sure to contact your doctor if:    Your pressure injuries are not healing. You have new pressure injuries. You need help changing positions in bed or in a chair. Your caregiver needs help to move you. What to do after you leave the hospital:    Recommended diet: regular diet    Recommended activity: activity as tolerated    The following personal items were collected during your admission and were returned to you:    Belongings  Dental Appliances: None  Vision - Corrective Lenses: None  Hearing Aid: None  Clothing: Footwear, Pants, Shirt, Socks, Undergarments  Jewelry: Body Piercing, Bracelet  Body Piercings Removed: Yes (tongue)  Electronic Devices: Cell Phone,   Weapons (Notify Protective Services/Security): None  Other Valuables: Tory Josefina, Wheelchair, Cigarettes  Home Medications: None  Valuables Given To: Patient  Provide Name(s) of Who Valuable(s) Were Given To: na  Responsible person(s) in the waiting room: na  Patient approves for provider to speak to responsible person post operatively: Yes    Information obtained by:  By signing below, I understand that if any problems occur once I leave the hospital I am to contact Roberth Cardoza PA-C.   I understand and acknowledge receipt of the instructions

## 2023-05-24 NOTE — PROGRESS NOTES
Admitting Date and Time: 5/20/2023 11:25 PM  Admit Dx: Gluteal abscess [L02.31]  Wound, open [T14. 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     sodium chloride flush  5-40 mL IntraVENous 2 times per day    piperacillin-tazobactam  3,375 mg IntraVENous Q8H    sodium chloride flush  5-40 mL IntraVENous 2 times per day     sodium chloride flush, 5-40 mL, PRN  sodium chloride, , PRN  acetaminophen, 650 mg, Q6H PRN   Or  acetaminophen, 650 mg, Q6H PRN  bisacodyl, 10 mg, PRN  sodium chloride flush, 5-40 mL, PRN  sodium chloride, , PRN         Objective:    /84   Pulse 52   Temp 97.9 °F (36.6 °C) (Oral)   Resp 16   Wt 175 lb (79.4 kg)   SpO2 100%   General Appearance: alert and oriented to person, place and time and in no acute distress  Skin: warm and dry  Head: normocephalic and atraumatic  Eyes: pupils equal, round, and reactive to light, extraocular eye movements intact, conjunctivae normal  Neck: neck supple and non tender without mass   Pulmonary/Chest: clear to auscultation bilaterally- no wheezes, rales or rhonchi, normal air movement, no respiratory distress  Cardiovascular: normal rate, normal S1 and S2 and no carotid bruits  Abdomen: soft, non-tender, non-distended, normal bowel sounds, no masses or organomegaly  Extremities: no cyanosis, no clubbing and no  edema  Neurologic: no cranial nerve deficit and speech normal      Recent Labs     05/20/23  2359      K 4.4      CO2 29   BUN 8   CREATININE 0.7   GLUCOSE 79   CALCIUM 9.4       Recent Labs     05/20/23  2359   ALKPHOS 80   PROT 7.7   LABALBU 3.8   BILITOT <0.2   AST 12   ALT 7       Recent Labs     05/20/23  2359   WBC 9.0   RBC 5.90*   HGB 13.9   HCT 45.7   MCV 77.5*   MCH 23.6*   MCHC 30.4*   RDW 14.6   *   MPV 8.9           Radiology:   CT ABDOMEN PELVIS W IV CONTRAST Additional
Admitting Date and Time: 5/20/2023 11:25 PM  Admit Dx: Gluteal abscess [L02.31]  Wound, open [T14. 8XXA]  Pressure injury of left buttock, stage 3 (HCC) [L89.323]    Subjective:    Pt feels well. No further questions about my recommendation to avoid thc  Per RN: no issues    ROS: denies fever, chills, cp, sob, n/v, HA unless stated above.      nicotine  1 patch TransDERmal Daily    sodium chloride flush  5-40 mL IntraVENous 2 times per day    piperacillin-tazobactam  3,375 mg IntraVENous Q8H    sodium chloride flush  5-40 mL IntraVENous 2 times per day     calcium carbonate, 500 mg, TID PRN  sodium chloride flush, 5-40 mL, PRN  sodium chloride, , PRN  acetaminophen, 650 mg, Q6H PRN   Or  acetaminophen, 650 mg, Q6H PRN  bisacodyl, 10 mg, PRN  sodium chloride flush, 5-40 mL, PRN  sodium chloride, , PRN         Objective:    /73   Pulse 85   Temp 98.1 °F (36.7 °C) (Oral)   Resp 16   Ht 6' 2\" (1.88 m)   Wt 176 lb 8 oz (80.1 kg)   SpO2 100%   BMI 22.66 kg/m²   General Appearance: alert and oriented to person, place and time and in no acute distress  Skin: warm and dry  Head: normocephalic and atraumatic  Eyes: pupils equal, round, and reactive to light, extraocular eye movements intact, conjunctivae normal  Neck: neck supple and non tender without mass   Pulmonary/Chest: clear to auscultation bilaterally- no wheezes, rales or rhonchi, normal air movement, no respiratory distress  Cardiovascular: normal rate, normal S1 and S2 and no carotid bruits  Abdomen: soft, non-tender, non-distended, normal bowel sounds, no masses or organomegaly  Extremities: no cyanosis, no clubbing and no  edema  Neurologic: no cranial nerve deficit and speech normal      Recent Labs     05/20/23 2359 05/22/23  0911    140   K 4.4 4.1    103   CO2 29 28   BUN 8 8   CREATININE 0.7 0.7   GLUCOSE 79 73*   CALCIUM 9.4 9.2         Recent Labs     05/20/23 2359   ALKPHOS 80   PROT 7.7   LABALBU 3.8   BILITOT <0.2   AST 12
Admitting Date and Time: 5/20/2023 11:25 PM  Admit Dx: Gluteal abscess [L02.31]  Wound, open [T14. 8XXA]  Pressure injury of left buttock, stage 3 (HCC) [L89.323]    Subjective:  No new complaints  Per RN: no issues    ROS: denies fever, chills, cp, sob, n/v, HA unless stated above. nicotine  1 patch TransDERmal Daily    sodium chloride flush  5-40 mL IntraVENous 2 times per day    piperacillin-tazobactam  3,375 mg IntraVENous Q8H    sodium chloride flush  5-40 mL IntraVENous 2 times per day     calcium carbonate, 500 mg, TID PRN  sodium chloride flush, 5-40 mL, PRN  sodium chloride, , PRN  acetaminophen, 650 mg, Q6H PRN   Or  acetaminophen, 650 mg, Q6H PRN  bisacodyl, 10 mg, PRN  sodium chloride flush, 5-40 mL, PRN  sodium chloride, , PRN         Objective:    /61   Pulse 65   Temp 98 °F (36.7 °C) (Oral)   Resp 18   Ht 6' 2\" (1.88 m)   Wt 176 lb 8 oz (80.1 kg)   SpO2 98%   BMI 22.66 kg/m²   General Appearance: alert and oriented to person, place and time and in no acute distress  Skin: warm and dry  Head: normocephalic and atraumatic  Eyes: pupils equal, round, and reactive to light, extraocular eye movements intact, conjunctivae normal  Neck: neck supple and non tender without mass   Pulmonary/Chest: clear to auscultation bilaterally- no wheezes, rales or rhonchi, normal air movement, no respiratory distress  Cardiovascular: normal rate, normal S1 and S2 and no carotid bruits  Abdomen: soft, non-tender, non-distended, normal bowel sounds, no masses or organomegaly  Extremities: no cyanosis, no clubbing and no  edema  Neurologic: no cranial nerve deficit and speech normal      Recent Labs     05/22/23  0911      K 4.1      CO2 28   BUN 8   CREATININE 0.7   GLUCOSE 73*   CALCIUM 9.2         No results for input(s): ALKPHOS, PROT, LABALBU, BILITOT, AST, ALT in the last 72 hours.       Recent Labs     05/22/23  0911   WBC 7.6   RBC 5.36   HGB 12.7   HCT 42.7   MCV 79.7*   MCH 23.7*   MCHC
CLINICAL PHARMACY NOTE: MEDS TO BEDS    Total # of Prescriptions Filled: 2   The following medications were delivered to the patient:  Cefdinir 300 mg  Doxycycline Hyc 100 mg    Additional Documentation:
NAME: Yoselin Mccullough  MR:  [id number]  :   1995  Admit Date:  2023    Elements of this note, were copied and pasted from Previous. Updates have been made where noted and reflect current exam and medical decision making from the DOS of this encounter. CHIEF COMPLAINT       Chief Complaint   Patient presents with    Wound Check     Pt has had a wound on the left buttocks since 2022. Pt had been seeing a wound care specialist that had been treating it with a wound vac, but had wound care stopped prematurely in mid-December due to moving. Wound looks appears to be infected and draining purulent drainage     HISTORY OF PRESENT ILLNESS     Yoselin Mccullough is a 24 y.o. male 2023 and has  has a past medical history of Spina bifida ([address] This is a face to face encounter 23  Pt in bed has  no c/o f/c/n/v/d/afebrile     Patient is tolerating medications. No reported adverse drug reactions. Available labs, imaging studies, microbiologic studies have been reviewed with pt and family if present. Assessment & Plan   Pt was admitted with   Chief Complaint   Patient presents with    Wound Check     Pt has had a wound on the left buttocks since 2022. Pt had been seeing a wound care specialist that had been treating it with a wound vac, but had wound care stopped prematurely in mid-December due to moving. Wound looks appears to be infected and draining purulent drainage     Gluteal abscess [L02.31]  Wound, open [T14. 8XXA]  Pressure injury of left buttock, stage 3 (HCC) [L89.323]  ID following for   H/o spina bifida has left ischial wound POA  Cont atbx a frye to be placed        piperacillin-tazobactam (ZOSYN) 3,375 mg in sodium chloride 0.9 % 50 mL IVPB ([id number]), Q8H       Suggest consulting wound care/ostomy nurse  Continue wound care  Encourage nutritional support  Continue to off load wound/pressure relief bed        DISPOSITION:  REVIEW OF SYSTEMS     As stated above
growth  Identification and sensitivity to follow      Narrative:      Source: DECUB       Site:               Culture, Anaerobic [[id number]] Collected: 05/20/23 2359    Order Status: Completed Specimen: Decubitus Updated: 05/23/23 0930     Anaerobic Culture --     Swab collections are low-yield and rarely indicated. Generally, the specimen volume when collected by swab is  small, reducing the probability of isolating organisms: many  organisms adhere to the fibers of the swab, which reduces the  opportunity of recovering organisms. Anaerobes not isolated      Narrative:      Source: DECUB       Site:                       Lab Results   Component Value Date/Time    BC 24 Hours no growth 05/20/2023 11:59 PM    BLOODCULT2 24 Hours no growth 05/20/2023 11:59 PM    ORG Gram negative bao 05/20/2023 11:59 PM     No results for input(s): CDIFFTOXANT in the last 72 hours.   WOUND/ABSCESS   Date Value Ref Range Status   05/20/2023 (A)  Preliminary    Growth present, evaluating for:  Mixed organisms including Proteus sp     05/20/2023   Preliminary    Light growth  Identification and sensitivity to follow       Recent Labs     05/20/23 2359 05/22/23  0911   WBC 9.0 7.6   * 375       Recent Labs     05/20/23 2359 05/22/23  0911    140   K 4.4 4.1    103   CO2 29 28   BUN 8 8   CREATININE 0.7 0.7   GLUCOSE 79 73*   PROT 7.7  --    LABALBU 3.8  --    CALCIUM 9.4 9.2   BILITOT <0.2  --    ALKPHOS 80  --    AST 12  --    ALT 7  --        Lab Results   Component Value Date    SEDRATE 27 (H) 05/20/2023     Lab Results   Component Value Date    CRP 1.4 (H) 05/20/2023     No results found for: PROCAL  Recent Labs     05/20/23 2359   SEDRATE 27*   CRP 1.4*   AST 12   ALT 7       No results found for: [facility]    Radiology:  CT ABDOMEN PELVIS W IV CONTRAST Additional Contrast? None    Result Date: 5/21/2023  EXAMINATION: CT OF THE ABDOMEN AND PELVIS WITH CONTRAST 5/21/2023 12:59 am TECHNIQUE: CT of the abdomen and
excluded. The the the     The large soft tissue ulceration extending inferior and dorsal to the left ischial tuberosity. 2.0 cm gas containing possible fluid collection involving the left side of the gluteal crease and worrisome for abscess. Diffuse colonic fecal retention with significant stool burden. Significant diffuse urinary bladder wall thickening. Imaging and labs were reviewed per medical records. Thank you for involving me in the care of Lannette Sandifer will continue to follow. Please do not hesitate to call [phone] for any questions or concerns.     Electronically signed by CLAU Owusu on 5/24/2023 at 11:07 AM      Pt was seen before d/c he has no c/o or concerns rec to follow up at wound clinic pt will arrange  Cont albertox  Cesilia Guallpa MD

## 2023-05-26 LAB
BACTERIA BLD CULT ORG #2: NORMAL
BACTERIA BLD CULT: NORMAL

## 2023-07-05 ENCOUNTER — TELEPHONE (OUTPATIENT)
Dept: PRIMARY CARE | Facility: CLINIC | Age: 28
End: 2023-07-05
Payer: COMMERCIAL

## 2023-07-05 NOTE — TELEPHONE ENCOUNTER
Home Health Care called patient is not complaint my have to discharge just wanted [facility] to know.

## 2024-01-05 ENCOUNTER — LAB REQUISITION (OUTPATIENT)
Dept: LAB | Facility: HOSPITAL | Age: 29
End: 2024-01-05
Payer: COMMERCIAL

## 2024-01-05 ENCOUNTER — OFFICE VISIT (OUTPATIENT)
Dept: WOUND CARE | Facility: CLINIC | Age: 29
End: 2024-01-05
Payer: COMMERCIAL

## 2024-01-05 DIAGNOSIS — L89.324 PRESSURE ULCER OF LEFT BUTTOCK, STAGE 4 (MULTI): ICD-10-CM

## 2024-01-05 PROCEDURE — 11043 DBRDMT MUSC&/FSCA 1ST 20/<: CPT

## 2024-01-05 PROCEDURE — 11043 DBRDMT MUSC&/FSCA 1ST 20/<: CPT | Performed by: CLINICAL NURSE SPECIALIST

## 2024-01-05 PROCEDURE — 99213 OFFICE O/P EST LOW 20 MIN: CPT | Mod: 25

## 2024-01-05 PROCEDURE — 87205 SMEAR GRAM STAIN: CPT | Mod: OUT,PORLAB | Performed by: CLINICAL NURSE SPECIALIST

## 2024-01-06 DIAGNOSIS — L03.90 CELLULITIS, UNSPECIFIED CELLULITIS SITE: Primary | ICD-10-CM

## 2024-01-06 RX ORDER — CEPHALEXIN 500 MG/1
500 CAPSULE ORAL 3 TIMES DAILY
Qty: 21 CAPSULE | Refills: 0 | Status: SHIPPED | OUTPATIENT
Start: 2024-01-06 | End: 2024-01-13

## 2024-01-06 NOTE — PROGRESS NOTES
Lab called. Wound care sent culture of wound and was +group A strep. Attempted to call pt multiple times.. did speak to dad who said pt not on abx. Sent in cephalexin. Follow up this week for recheck w/ PCP. Rec pt go to the ER if any of symptoms are significantly worsening.

## 2024-01-07 LAB
BACTERIA SPEC CULT: ABNORMAL
GRAM STN SPEC: ABNORMAL
GRAM STN SPEC: ABNORMAL

## 2024-01-09 ENCOUNTER — TELEPHONE (OUTPATIENT)
Dept: PRIMARY CARE | Facility: CLINIC | Age: 29
End: 2024-01-09
Payer: COMMERCIAL

## 2024-01-09 NOTE — TELEPHONE ENCOUNTER
Deepa called asking if you would follow for [facility] orders?  Pt last seen 4/2023  They need a H&P sent to # [phone]. x

## 2024-01-12 ENCOUNTER — OFFICE VISIT (OUTPATIENT)
Dept: WOUND CARE | Facility: CLINIC | Age: 29
End: 2024-01-12
Payer: COMMERCIAL

## 2024-01-12 PROCEDURE — 11043 DBRDMT MUSC&/FSCA 1ST 20/<: CPT | Mod: ZK

## 2024-01-12 PROCEDURE — 11043 DBRDMT MUSC&/FSCA 1ST 20/<: CPT | Performed by: CLINICAL NURSE SPECIALIST

## 2024-01-19 ENCOUNTER — APPOINTMENT (OUTPATIENT)
Dept: WOUND CARE | Facility: CLINIC | Age: 29
End: 2024-01-19
Payer: COMMERCIAL

## 2024-01-26 ENCOUNTER — OFFICE VISIT (OUTPATIENT)
Dept: WOUND CARE | Facility: CLINIC | Age: 29
End: 2024-01-26
Payer: COMMERCIAL

## 2024-01-26 PROCEDURE — 11042 DBRDMT SUBQ TIS 1ST 20SQCM/<: CPT | Mod: ZK

## 2024-01-26 PROCEDURE — 11042 DBRDMT SUBQ TIS 1ST 20SQCM/<: CPT | Performed by: CLINICAL NURSE SPECIALIST

## 2024-01-30 ENCOUNTER — APPOINTMENT (OUTPATIENT)
Dept: PRIMARY CARE | Facility: CLINIC | Age: 29
End: 2024-01-30
Payer: COMMERCIAL

## 2024-01-30 PROBLEM — D64.9 ANEMIA: Status: ACTIVE | Noted: 2023-05-06

## 2024-01-30 PROBLEM — K59.2 NEUROGENIC BOWEL: Status: ACTIVE | Noted: 2023-05-21

## 2024-01-30 PROBLEM — Z97.8 CHRONIC INDWELLING FOLEY CATHETER: Status: ACTIVE | Noted: 2023-05-21

## 2024-01-30 RX ORDER — HYOSCYAMINE SULFATE 16 OZ
SOLUTION MISCELLANEOUS
COMMUNITY
Start: 2024-01-05

## 2024-02-02 ENCOUNTER — OFFICE VISIT (OUTPATIENT)
Dept: WOUND CARE | Facility: CLINIC | Age: 29
End: 2024-02-02
Payer: COMMERCIAL

## 2024-02-02 PROCEDURE — 11042 DBRDMT SUBQ TIS 1ST 20SQCM/<: CPT | Performed by: CLINICAL NURSE SPECIALIST

## 2024-02-02 PROCEDURE — 11042 DBRDMT SUBQ TIS 1ST 20SQCM/<: CPT | Mod: ZK

## 2024-02-09 ENCOUNTER — OFFICE VISIT (OUTPATIENT)
Dept: WOUND CARE | Facility: CLINIC | Age: 29
End: 2024-02-09
Payer: COMMERCIAL

## 2024-02-09 PROCEDURE — 11042 DBRDMT SUBQ TIS 1ST 20SQCM/<: CPT | Performed by: CLINICAL NURSE SPECIALIST

## 2024-02-09 PROCEDURE — 11042 DBRDMT SUBQ TIS 1ST 20SQCM/<: CPT | Mod: ZK

## 2024-02-12 ENCOUNTER — TELEMEDICINE (OUTPATIENT)
Dept: PRIMARY CARE | Facility: CLINIC | Age: 29
End: 2024-02-12
Payer: COMMERCIAL

## 2024-02-12 DIAGNOSIS — J01.90 ACUTE SINUSITIS, RECURRENCE NOT SPECIFIED, UNSPECIFIED LOCATION: ICD-10-CM

## 2024-02-12 DIAGNOSIS — J02.9 SORE THROAT: Primary | ICD-10-CM

## 2024-02-12 PROCEDURE — 99213 OFFICE O/P EST LOW 20 MIN: CPT | Performed by: PHYSICIAN ASSISTANT

## 2024-02-12 RX ORDER — AMOXICILLIN 500 MG/1
1000 CAPSULE ORAL 2 TIMES DAILY
Qty: 40 CAPSULE | Refills: 0 | Status: SHIPPED | OUTPATIENT
Start: 2024-02-12 | End: 2024-02-22

## 2024-02-12 ASSESSMENT — ENCOUNTER SYMPTOMS
TROUBLE SWALLOWING: 0
DIARRHEA: 0
NECK PAIN: 0
SORE THROAT: 1
HOARSE VOICE: 0
COUGH: 1
ABDOMINAL PAIN: 0
SHORTNESS OF BREATH: 0
STRIDOR: 1
HEADACHES: 0

## 2024-02-12 NOTE — PROGRESS NOTES
Subjective   Patient ID: Alexis Ruiz is a 28 y.o. male who presents for Sore Throat and URI.    Virtual or Telephone Consent    An interactive audio and video telecommunication system which permits real time communications between the patient (at the originating site) and provider (at the distant site) was utilized to provide this telehealth service.   Verbal consent was requested and obtained from Alexis Ruiz on this date, 02/12/24 for a telehealth visit.     Sore Throat   This is a recurrent problem. The current episode started 1 to 4 weeks ago. The problem has been resolved. The pain is worse on the right side. There has been no fever. The pain is at a severity of 4/10. Associated symptoms include congestion, coughing and a plugged ear sensation. Pertinent negatives include no abdominal pain, diarrhea, drooling, ear discharge, ear pain, headaches, hoarse voice, neck pain, shortness of breath or trouble swallowing. He has had no exposure to strep or mono.      Patient complains of sore throat and nasal congestion the past couple weeks. States that the back part of his tongue seems to get sore at times but no lesions seen on his tongue, but this is better lately. No fevers or chills.  Has had some coughing and ears feel plugged at times. Getting sinus pressure.     No known exposure to strep or COVID or flu.       reports that he has been smoking cigarettes. He has been smoking an average of 1 pack per day. He has never used smokeless tobacco.     Review of Systems   HENT:  Positive for congestion and sore throat. Negative for drooling, ear discharge, ear pain, hoarse voice and trouble swallowing.    Respiratory:  Positive for cough. Negative for shortness of breath.    Gastrointestinal:  Negative for abdominal pain and diarrhea.   Musculoskeletal:  Negative for neck pain.   Neurological:  Negative for headaches.       Objective   There were no vitals taken for this visit.    Physical  Exam  Constitutional:       General: He is not in acute distress.     Appearance: Normal appearance. He is not ill-appearing or toxic-appearing.   HENT:      Head: Normocephalic.   Eyes:      General: No scleral icterus.  Pulmonary:      Effort: Pulmonary effort is normal.   Neurological:      Mental Status: He is alert.         Assessment/Plan   Diagnoses and all orders for this visit:  Sore throat  Acute sinusitis, recurrence not specified, unspecified location  -     amoxicillin (Amoxil) 500 mg capsule; Take 2 capsules (1,000 mg) by mouth 2 times a day for 10 days.       Rx Amoxicillin.   Do warm salt water gargles.  Hydrate well.  If symptoms significantly increase or if not improving over the next 2 weeks and he should have a follow-up in office for exam.  Follow-up as needed.      Duration of video visit: 12m 18s

## 2024-02-15 ENCOUNTER — APPOINTMENT (OUTPATIENT)
Dept: WOUND CARE | Facility: CLINIC | Age: 29
End: 2024-02-15
Payer: COMMERCIAL

## 2024-02-22 ENCOUNTER — APPOINTMENT (OUTPATIENT)
Dept: WOUND CARE | Facility: CLINIC | Age: 29
End: 2024-02-22
Payer: COMMERCIAL

## 2024-02-29 ENCOUNTER — APPOINTMENT (OUTPATIENT)
Dept: WOUND CARE | Facility: CLINIC | Age: 29
End: 2024-02-29
Payer: COMMERCIAL

## 2024-03-07 ENCOUNTER — OFFICE VISIT (OUTPATIENT)
Dept: WOUND CARE | Facility: CLINIC | Age: 29
End: 2024-03-07
Payer: COMMERCIAL

## 2024-03-07 PROCEDURE — 11043 DBRDMT MUSC&/FSCA 1ST 20/<: CPT

## 2024-03-15 ENCOUNTER — APPOINTMENT (OUTPATIENT)
Dept: WOUND CARE | Facility: CLINIC | Age: 29
End: 2024-03-15
Payer: COMMERCIAL

## 2024-03-20 ENCOUNTER — OFFICE VISIT (OUTPATIENT)
Dept: WOUND CARE | Facility: CLINIC | Age: 29
End: 2024-03-20
Payer: COMMERCIAL

## 2024-03-20 PROCEDURE — 11043 DBRDMT MUSC&/FSCA 1ST 20/<: CPT | Mod: ZK

## 2024-03-20 PROCEDURE — 11043 DBRDMT MUSC&/FSCA 1ST 20/<: CPT | Performed by: CLINICAL NURSE SPECIALIST

## 2024-03-26 ENCOUNTER — OFFICE VISIT (OUTPATIENT)
Dept: PRIMARY CARE | Facility: CLINIC | Age: 29
End: 2024-03-26
Payer: COMMERCIAL

## 2024-03-26 ENCOUNTER — LAB (OUTPATIENT)
Dept: LAB | Facility: LAB | Age: 29
End: 2024-03-26
Payer: COMMERCIAL

## 2024-03-26 VITALS — HEART RATE: 88 BPM | DIASTOLIC BLOOD PRESSURE: 74 MMHG | SYSTOLIC BLOOD PRESSURE: 126 MMHG | TEMPERATURE: 97.3 F

## 2024-03-26 DIAGNOSIS — N31.9 NEUROGENIC BLADDER: ICD-10-CM

## 2024-03-26 DIAGNOSIS — Z86.39 HISTORY OF HYPOTHYROIDISM: ICD-10-CM

## 2024-03-26 DIAGNOSIS — R82.90 MALODOROUS URINE: ICD-10-CM

## 2024-03-26 DIAGNOSIS — K59.2 NEUROGENIC BOWEL: ICD-10-CM

## 2024-03-26 DIAGNOSIS — R82.90 ABNORMAL URINE: ICD-10-CM

## 2024-03-26 DIAGNOSIS — L89.324 PRESSURE INJURY OF LEFT BUTTOCK, STAGE 4 (MULTI): ICD-10-CM

## 2024-03-26 DIAGNOSIS — Z00.00 ROUTINE GENERAL MEDICAL EXAMINATION AT A HEALTH CARE FACILITY: ICD-10-CM

## 2024-03-26 DIAGNOSIS — Q05.9 SPINA BIFIDA WITHOUT HYDROCEPHALUS, UNSPECIFIED SPINAL REGION (MULTI): Primary | ICD-10-CM

## 2024-03-26 DIAGNOSIS — F31.9 BIPOLAR AFFECTIVE DISORDER, REMISSION STATUS UNSPECIFIED (MULTI): ICD-10-CM

## 2024-03-26 PROBLEM — E11.319 ADVANCED DIABETIC RETINAL DISEASE (MULTI): Status: RESOLVED | Noted: 2023-04-06 | Resolved: 2024-03-26

## 2024-03-26 LAB
APPEARANCE UR: ABNORMAL
BACTERIA #/AREA URNS AUTO: ABNORMAL /HPF
BILIRUB UR STRIP.AUTO-MCNC: NEGATIVE MG/DL
COLOR UR: YELLOW
GLUCOSE UR STRIP.AUTO-MCNC: NEGATIVE MG/DL
KETONES UR STRIP.AUTO-MCNC: NEGATIVE MG/DL
LEUKOCYTE ESTERASE UR QL STRIP.AUTO: ABNORMAL
MUCOUS THREADS #/AREA URNS AUTO: ABNORMAL /LPF
NITRITE UR QL STRIP.AUTO: NEGATIVE
PH UR STRIP.AUTO: 6 [PH]
PROT UR STRIP.AUTO-MCNC: ABNORMAL MG/DL
RBC # UR STRIP.AUTO: ABNORMAL /UL
RBC #/AREA URNS AUTO: >20 /HPF
SP GR UR STRIP.AUTO: 1.01
SQUAMOUS #/AREA URNS AUTO: ABNORMAL /HPF
UROBILINOGEN UR STRIP.AUTO-MCNC: <2 MG/DL
WBC #/AREA URNS AUTO: >50 /HPF
WBC CLUMPS #/AREA URNS AUTO: ABNORMAL /HPF

## 2024-03-26 PROCEDURE — 81001 URINALYSIS AUTO W/SCOPE: CPT

## 2024-03-26 PROCEDURE — 87086 URINE CULTURE/COLONY COUNT: CPT

## 2024-03-26 PROCEDURE — 99214 OFFICE O/P EST MOD 30 MIN: CPT | Performed by: PHYSICIAN ASSISTANT

## 2024-03-26 PROCEDURE — G0439 PPPS, SUBSEQ VISIT: HCPCS | Performed by: PHYSICIAN ASSISTANT

## 2024-03-26 PROCEDURE — 4004F PT TOBACCO SCREEN RCVD TLK: CPT | Performed by: PHYSICIAN ASSISTANT

## 2024-03-26 ASSESSMENT — ENCOUNTER SYMPTOMS
ABDOMINAL PAIN: 0
SHORTNESS OF BREATH: 0
PALPITATIONS: 0

## 2024-03-26 ASSESSMENT — ACTIVITIES OF DAILY LIVING (ADL)
GROCERY_SHOPPING: NEEDS ASSISTANCE
DOING_HOUSEWORK: NEEDS ASSISTANCE
BATHING: INDEPENDENT
MANAGING_FINANCES: NEEDS ASSISTANCE
DRESSING: INDEPENDENT
TAKING_MEDICATION: NEEDS ASSISTANCE

## 2024-03-26 ASSESSMENT — PATIENT HEALTH QUESTIONNAIRE - PHQ9
2. FEELING DOWN, DEPRESSED OR HOPELESS: NOT AT ALL
SUM OF ALL RESPONSES TO PHQ9 QUESTIONS 1 AND 2: 0
1. LITTLE INTEREST OR PLEASURE IN DOING THINGS: NOT AT ALL

## 2024-03-26 NOTE — PROGRESS NOTES
"Subjective   Patient ID: Alexis Ruiz is a 28 y.o. male who presents for Medicare Annual Wellness Visit Subsequent.    HPI     Patient presents for recheck of spina bifida and neurogenic bladder and medicare AWE.     Here with his dad for recheck of congenital spina bifida and neurogenic bladder.  Has seen urologist Dr Stanley but hasn't been there since 11/2021.  Has had no changes. Still uses Gutierrez catheter with a leg bag 24/7. Since he is active and having to transport himself to and from his wheelchair he has to replace catheter tube/bags every week or 2. Still gets supplies through [employer]. Also still uses some disposable pull-on briefs incontinence pads and underpads   Uses these briefs/pads since he still leaks some urine, though using a slightly larger sized catheter tube.     Having a lot of sediment in urine recently with malodorous urine. Concerned he has a UTI--states that this is a symptoms he usually gets with UTIs.  Does not get much abdominal pain due to his spina bifida/neurogenic bladder.     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 Gets around fairly well in his wheelchair.     Is still working with the wound clinic for a pressure ulcer on buttock that is slowly improving.  Home nurse changing dressing 2-3 times per week -- they come tomorrow.     Not on any medications -- refuses to take any, including for Bipolar disease that he was diagnosed with as a young teenager.  Feels like mood is doing fine now.  Years ago when when he was on bipolar meds he feels like they \"just doped him up.\"  Father feels like he no longer has the the bipolar episodes/flairs and is doing fairly well.     No problems with his asthma or allergies in recent years.  No known diabetes and no eye problems.     Smoking cigs 0.5-1ppd.       Past Medical History:   Diagnosis Date    Allergic     Anxiety     Bipolar affective disorder (CMS/HCC)     Neurogenic bladder     Spina bifida (CMS/HCC)     "   Family History   Adopted: Yes      Social History     Tobacco Use    Smoking status: Every Day     Packs/day: 1     Types: Cigarettes    Smokeless tobacco: Never   Vaping Use    Vaping Use: Every day    Substances: Nicotine, Flavoring   Substance Use Topics    Alcohol use: Not Currently     Comment: 1-2 drinks per month    Drug use: Never           Medicare Annual Wellness visit.   Reviewed patient's answers to all Medicare screening questionnaire questions regarding falls, depression, general health status, nutrition and exercise, functional ability/level of safety, home safety risks, and ADLs/IADLs.     Healthcare POA and Living Will status: Doesn't have in place. Instructed on doin this.     Reviewed meds --not taking any meds.  Is not taking any high risk controlled/opioid medications.     Has neurogenic bladder so is incontinent and has to use Gutierrez Catheter 24/7. Has neurogenic bladder.  Both from spina bifida.     Colonoscopy: Not indicated due to age.     The patient has not felt down, felt depressed, felt hopeless, felt socially isolated or had little interest or pleasure in doing things over the past 2 weeks. No trouble with bathing, communicating, using the phone, dressing, eating, preparing meals, grooming, or toileting. Needs assistance with managing finances, grocery shopping, managing meds, transportation, and doing housework (dad assists). Can't walk due to Spina Bifida so uses wheelchair. Reports no falls.  The home does have grab bars in the bathroom. Has handrails on the stairs. The home does not have poor lighting or loose rugs.   Doesn't have hearing difficulty. No diet restrictions. Eats well balanced diet.  No recent hospitalizations.    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 Gets around fairly well in his wheelchair.     Feels health is fair.     Has no significant chronic pain (pain scale 1/10).      No exercise.     No cognitive impairment.     No DM or nephropathy.     Sees wound  clinic regularly.   Previously was seeing uro Dr Stanley.     Review of Systems   Respiratory:  Negative for shortness of breath.    Cardiovascular:  Negative for chest pain and palpitations.   Gastrointestinal:  Negative for abdominal pain.       Objective   /74   Pulse 88   Temp 36.3 °C (97.3 °F)     Physical Exam  Constitutional:       Comments: In wheelchair. Partial right leg amputation.    HENT:      Head: Normocephalic.   Eyes:      General: No scleral icterus.  Cardiovascular:      Rate and Rhythm: Normal rate and regular rhythm.   Pulmonary:      Effort: Pulmonary effort is normal.      Breath sounds: Normal breath sounds.   Abdominal:      Palpations: Abdomen is soft. There is no mass.      Tenderness: There is no abdominal tenderness.   Skin:     General: Skin is warm and dry.   Neurological:      Mental Status: He is alert.   Psychiatric:         Mood and Affect: Affect normal.         Assessment/Plan   Diagnoses and all orders for this visit:  Spina bifida without hydrocephalus, unspecified spinal region (CMS/HCC)  Neurogenic bladder  -     Urinalysis with Reflex Microscopic; Future  -     Urine Culture; Future  Pressure injury of left buttock, stage 4 (CMS/[facility])  Abnormal urine  -     Urinalysis with Reflex Microscopic; Future  -     Urine Culture; Future  -     TSH with reflex to Free T4 if abnormal; Future  Malodorous urine  -     Urinalysis with Reflex Microscopic; Future  -     Urine Culture; Future  Neurogenic bowel  Routine general medical examination at a health care facility  -     Lipid Panel; Future  -     Basic Metabolic Panel; Future  -     Hemoglobin A1c; Future  -     TSH with reflex to Free T4 if abnormal; Future  Bipolar affective disorder, remission status unspecified (CMS/[facility])  History of hypothyroidism  -     TSH with reflex to Free T4 if abnormal; Future       It is medically necessary for patient to have incontinence supplies and anaya catheter supplies due to chronic hx of  neurogenic bladder.     Encouraged to discontinue smoking.   Stay active.   Ordered UA and urine culture --patient will talk with his home nurse when she comes tomorrow to see if she can complete this order but if not we will go to outpatient lab and he will straight cath himself for the urine specimen.  Will see if has UTI and base treatment on results.  Get fasting labs including lipids, BMP, A1c.  Discussed arranging healthcare power of /living well and advised to give me a copy of this for his chart.  Encouraged to discontinue smoking and discussed risks of continued tobacco use.  Continue to follow with wound clinic.  Advised to schedule follow-up with urologist.  Follow up as needed.

## 2024-03-27 DIAGNOSIS — N39.0 URINARY TRACT INFECTION ASSOCIATED WITH INDWELLING URETHRAL CATHETER, INITIAL ENCOUNTER (CMS-HCC): Primary | ICD-10-CM

## 2024-03-27 DIAGNOSIS — T83.511A URINARY TRACT INFECTION ASSOCIATED WITH INDWELLING URETHRAL CATHETER, INITIAL ENCOUNTER (CMS-HCC): Primary | ICD-10-CM

## 2024-03-27 RX ORDER — SULFAMETHOXAZOLE AND TRIMETHOPRIM 800; 160 MG/1; MG/1
1 TABLET ORAL 2 TIMES DAILY
Qty: 20 TABLET | Refills: 0 | Status: SHIPPED | OUTPATIENT
Start: 2024-03-27 | End: 2024-04-06

## 2024-03-28 LAB — BACTERIA UR CULT: ABNORMAL

## 2024-04-03 ENCOUNTER — APPOINTMENT (OUTPATIENT)
Dept: WOUND CARE | Facility: CLINIC | Age: 29
End: 2024-04-03
Payer: COMMERCIAL

## 2024-04-05 ENCOUNTER — APPOINTMENT (OUTPATIENT)
Dept: WOUND CARE | Facility: CLINIC | Age: 29
End: 2024-04-05
Payer: COMMERCIAL

## 2024-04-23 ENCOUNTER — OFFICE VISIT (OUTPATIENT)
Dept: WOUND CARE | Facility: CLINIC | Age: 29
End: 2024-04-23
Payer: COMMERCIAL

## 2024-04-23 PROCEDURE — 11043 DBRDMT MUSC&/FSCA 1ST 20/<: CPT | Mod: ZK

## 2024-04-23 PROCEDURE — 11043 DBRDMT MUSC&/FSCA 1ST 20/<: CPT | Performed by: CLINICAL NURSE SPECIALIST

## 2024-05-07 ENCOUNTER — APPOINTMENT (OUTPATIENT)
Dept: WOUND CARE | Facility: CLINIC | Age: 29
End: 2024-05-07
Payer: COMMERCIAL

## 2024-05-09 ENCOUNTER — OFFICE VISIT (OUTPATIENT)
Dept: WOUND CARE | Facility: CLINIC | Age: 29
End: 2024-05-09
Payer: COMMERCIAL

## 2024-05-09 PROCEDURE — 11043 DBRDMT MUSC&/FSCA 1ST 20/<: CPT

## 2024-05-15 ENCOUNTER — DOCUMENTATION (OUTPATIENT)
Dept: HOME HEALTH SERVICES | Facility: HOME HEALTH | Age: 29
End: 2024-05-15
Payer: COMMERCIAL

## 2024-05-15 ENCOUNTER — HOME HEALTH ADMISSION (OUTPATIENT)
Dept: HOME HEALTH SERVICES | Facility: HOME HEALTH | Age: 29
End: 2024-05-15
Payer: COMMERCIAL

## 2024-05-15 NOTE — HH CARE COORDINATION
Home Care received a Referral for Nursing. We have processed the referral for a Start of Care on 24-48 HOURS .     If you have any questions or concerns, please feel free to contact us at [phone]. Follow the prompts, enter your five digit zip code, and you will be directed to your care team on [facility].

## 2024-05-16 ENCOUNTER — HOME CARE VISIT (OUTPATIENT)
Dept: HOME HEALTH SERVICES | Facility: HOME HEALTH | Age: 29
End: 2024-05-16
Payer: COMMERCIAL

## 2024-05-16 PROCEDURE — G0299 HHS/HOSPICE OF RN EA 15 MIN: HCPCS

## 2024-05-16 PROCEDURE — 0023 HH SOC

## 2024-05-17 VITALS
WEIGHT: 100 LBS | DIASTOLIC BLOOD PRESSURE: 64 MMHG | BODY MASS INDEX: 13.56 KG/M2 | TEMPERATURE: 97.5 F | SYSTOLIC BLOOD PRESSURE: 130 MMHG | RESPIRATION RATE: 12 BRPM

## 2024-05-17 ASSESSMENT — ENCOUNTER SYMPTOMS
APPETITE LEVEL: GOOD
PERSON REPORTING PAIN: PATIENT
DENIES PAIN: 1
SUBJECTIVE PAIN PROGRESSION: UNCHANGED
HIGHEST PAIN SEVERITY IN PAST 24 HOURS: 0/10
LOWEST PAIN SEVERITY IN PAST 24 HOURS: 0/10
CHANGE IN APPETITE: UNCHANGED
PAIN SEVERITY GOAL: 0/10
FATIGUE: 1
FATIGUES EASILY: 1

## 2024-05-17 ASSESSMENT — ACTIVITIES OF DAILY LIVING (ADL): ENTERING_EXITING_HOME: MINIMUM ASSIST

## 2024-05-18 ASSESSMENT — ACTIVITIES OF DAILY LIVING (ADL)
CURRENT_FUNCTION: STAND BY ASSIST
OASIS_M1830: 03
AMBULATION ASSISTANCE: NON-AMBULATORY

## 2024-05-18 ASSESSMENT — ENCOUNTER SYMPTOMS: MUSCLE WEAKNESS: 1

## 2024-05-21 ENCOUNTER — OFFICE VISIT (OUTPATIENT)
Dept: WOUND CARE | Facility: CLINIC | Age: 29
End: 2024-05-21
Payer: COMMERCIAL

## 2024-05-21 PROCEDURE — 11042 DBRDMT SUBQ TIS 1ST 20SQCM/<: CPT

## 2024-05-23 ENCOUNTER — HOME CARE VISIT (OUTPATIENT)
Dept: HOME HEALTH SERVICES | Facility: HOME HEALTH | Age: 29
End: 2024-05-23
Payer: COMMERCIAL

## 2024-05-23 VITALS
HEART RATE: 68 BPM | RESPIRATION RATE: 18 BRPM | SYSTOLIC BLOOD PRESSURE: 132 MMHG | TEMPERATURE: 98.2 F | DIASTOLIC BLOOD PRESSURE: 65 MMHG | OXYGEN SATURATION: 99 %

## 2024-05-23 PROCEDURE — G0300 HHS/HOSPICE OF LPN EA 15 MIN: HCPCS

## 2024-05-23 ASSESSMENT — ENCOUNTER SYMPTOMS
LOWEST PAIN SEVERITY IN PAST 24 HOURS: 0/10
CHANGE IN APPETITE: UNCHANGED
SUBJECTIVE PAIN PROGRESSION: UNCHANGED
HIGHEST PAIN SEVERITY IN PAST 24 HOURS: 0/10
DENIES PAIN: 1
APPETITE LEVEL: GOOD
PERSON REPORTING PAIN: PATIENT
PAIN SEVERITY GOAL: 0/10

## 2024-05-23 NOTE — HOME HEALTH
Patient is alert and oriented times three. Lungs CTAFoley it in tact draining clear yellow uring into leg  bag.Wound care provided today, patient tolerated well.

## 2024-05-24 PROCEDURE — G0180 MD CERTIFICATION HHA PATIENT: HCPCS | Performed by: PHYSICIAN ASSISTANT

## 2024-05-25 ENCOUNTER — APPOINTMENT (OUTPATIENT)
Dept: HOME HEALTH SERVICES | Facility: HOME HEALTH | Age: 29
End: 2024-05-25
Payer: COMMERCIAL

## 2024-05-30 ENCOUNTER — HOME CARE VISIT (OUTPATIENT)
Dept: HOME HEALTH SERVICES | Facility: HOME HEALTH | Age: 29
End: 2024-05-30
Payer: COMMERCIAL

## 2024-05-30 PROCEDURE — G0299 HHS/HOSPICE OF RN EA 15 MIN: HCPCS

## 2024-05-31 VITALS
RESPIRATION RATE: 12 BRPM | DIASTOLIC BLOOD PRESSURE: 74 MMHG | SYSTOLIC BLOOD PRESSURE: 130 MMHG | HEART RATE: 86 BPM | TEMPERATURE: 97.1 F

## 2024-05-31 ASSESSMENT — ENCOUNTER SYMPTOMS
HIGHEST PAIN SEVERITY IN PAST 24 HOURS: 0/10
SUBJECTIVE PAIN PROGRESSION: UNCHANGED
LOWEST PAIN SEVERITY IN PAST 24 HOURS: 0/10
DENIES PAIN: 1
PAIN SEVERITY GOAL: 0/10
PERSON REPORTING PAIN: PATIENT

## 2024-06-01 ENCOUNTER — HOME CARE VISIT (OUTPATIENT)
Dept: HOME HEALTH SERVICES | Facility: HOME HEALTH | Age: 29
End: 2024-06-01
Payer: COMMERCIAL

## 2024-06-01 VITALS
SYSTOLIC BLOOD PRESSURE: 128 MMHG | TEMPERATURE: 98.8 F | RESPIRATION RATE: 18 BRPM | OXYGEN SATURATION: 98 % | HEART RATE: 98 BPM | DIASTOLIC BLOOD PRESSURE: 70 MMHG

## 2024-06-01 PROCEDURE — G0300 HHS/HOSPICE OF LPN EA 15 MIN: HCPCS

## 2024-06-01 ASSESSMENT — PAIN SCALES - PAIN ASSESSMENT IN ADVANCED DEMENTIA (PAINAD)
CONSOLABILITY: 0 - NO NEED TO CONSOLE.
FACIALEXPRESSION: 0
BODYLANGUAGE: 0 - RELAXED.
NEGVOCALIZATION: 0
TOTALSCORE: 0
FACIALEXPRESSION: 0 - SMILING OR INEXPRESSIVE.
NEGVOCALIZATION: 0 - NONE.
CONSOLABILITY: 0
BODYLANGUAGE: 0
BREATHING: 0

## 2024-06-01 ASSESSMENT — ENCOUNTER SYMPTOMS
MUSCLE WEAKNESS: 1
PERSON REPORTING PAIN: PATIENT
AGITATION: 1
STOOL FREQUENCY: LESS THAN DAILY
CHANGE IN APPETITE: UNCHANGED
SUBJECTIVE PAIN PROGRESSION: UNCHANGED
CONSTIPATION: 1
DENIES PAIN: 1
LAST BOWEL MOVEMENT: 66991
FATIGUE: 1
APPETITE LEVEL: GOOD
HIGHEST PAIN SEVERITY IN PAST 24 HOURS: 0/10
LOWEST PAIN SEVERITY IN PAST 24 HOURS: 0/10
PAIN SEVERITY GOAL: 0/10
CHANGE IN APPETITE: UNCHANGED

## 2024-06-01 ASSESSMENT — ACTIVITIES OF DAILY LIVING (ADL)
AMBULATION ASSISTANCE: NON-AMBULATORY
CURRENT_FUNCTION: ONE PERSON

## 2024-06-04 ENCOUNTER — OFFICE VISIT (OUTPATIENT)
Dept: WOUND CARE | Facility: CLINIC | Age: 29
End: 2024-06-04
Payer: COMMERCIAL

## 2024-06-04 PROCEDURE — 11042 DBRDMT SUBQ TIS 1ST 20SQCM/<: CPT

## 2024-06-07 ENCOUNTER — HOME CARE VISIT (OUTPATIENT)
Dept: HOME HEALTH SERVICES | Facility: HOME HEALTH | Age: 29
End: 2024-06-07
Payer: COMMERCIAL

## 2024-06-07 VITALS
TEMPERATURE: 98 F | OXYGEN SATURATION: 98 % | RESPIRATION RATE: 18 BRPM | SYSTOLIC BLOOD PRESSURE: 125 MMHG | DIASTOLIC BLOOD PRESSURE: 70 MMHG | HEART RATE: 80 BPM

## 2024-06-07 PROCEDURE — G0300 HHS/HOSPICE OF LPN EA 15 MIN: HCPCS

## 2024-06-07 ASSESSMENT — PAIN SCALES - PAIN ASSESSMENT IN ADVANCED DEMENTIA (PAINAD)
FACIALEXPRESSION: 0 - SMILING OR INEXPRESSIVE.
CONSOLABILITY: 0
NEGVOCALIZATION: 0
BODYLANGUAGE: 0
BODYLANGUAGE: 0 - RELAXED.
FACIALEXPRESSION: 0
NEGVOCALIZATION: 0 - NONE.
CONSOLABILITY: 0 - NO NEED TO CONSOLE.
TOTALSCORE: 0
BREATHING: 0

## 2024-06-07 ASSESSMENT — ENCOUNTER SYMPTOMS
PAIN SEVERITY GOAL: 0/10
HIGHEST PAIN SEVERITY IN PAST 24 HOURS: 0/10
DENIES PAIN: 1
CHANGE IN APPETITE: UNCHANGED
SUBJECTIVE PAIN PROGRESSION: UNCHANGED
APPETITE LEVEL: GOOD
PERSON REPORTING PAIN: PATIENT
LOWEST PAIN SEVERITY IN PAST 24 HOURS: 0/10

## 2024-06-07 NOTE — HOME HEALTH
wound care completed as ordered. Pt tolerated well. No ssx of infection. Wound supplies and cath supplies ordered for Pt.

## 2024-06-11 ENCOUNTER — HOME CARE VISIT (OUTPATIENT)
Dept: HOME HEALTH SERVICES | Facility: HOME HEALTH | Age: 29
End: 2024-06-11
Payer: COMMERCIAL

## 2024-06-11 PROCEDURE — G0300 HHS/HOSPICE OF LPN EA 15 MIN: HCPCS

## 2024-06-12 ASSESSMENT — ENCOUNTER SYMPTOMS
APPETITE LEVEL: GOOD
PAIN SEVERITY GOAL: 0/10
SUBJECTIVE PAIN PROGRESSION: UNCHANGED
DENIES PAIN: 1
PERSON REPORTING PAIN: PATIENT
CHANGE IN APPETITE: UNCHANGED
LOWEST PAIN SEVERITY IN PAST 24 HOURS: 0/10
HIGHEST PAIN SEVERITY IN PAST 24 HOURS: 0/10

## 2024-06-15 ENCOUNTER — HOME CARE VISIT (OUTPATIENT)
Dept: HOME HEALTH SERVICES | Facility: HOME HEALTH | Age: 29
End: 2024-06-15
Payer: COMMERCIAL

## 2024-06-15 VITALS
DIASTOLIC BLOOD PRESSURE: 68 MMHG | HEART RATE: 82 BPM | OXYGEN SATURATION: 99 % | TEMPERATURE: 98.2 F | RESPIRATION RATE: 16 BRPM | SYSTOLIC BLOOD PRESSURE: 126 MMHG

## 2024-06-15 PROCEDURE — G0300 HHS/HOSPICE OF LPN EA 15 MIN: HCPCS

## 2024-06-15 ASSESSMENT — ENCOUNTER SYMPTOMS
APPETITE LEVEL: GOOD
HIGHEST PAIN SEVERITY IN PAST 24 HOURS: 0/10
CHANGE IN APPETITE: UNCHANGED
PERSON REPORTING PAIN: PATIENT
PAIN SEVERITY GOAL: 0/10
DENIES PAIN: 1
SUBJECTIVE PAIN PROGRESSION: UNCHANGED
LOWEST PAIN SEVERITY IN PAST 24 HOURS: 0/10

## 2024-06-18 ENCOUNTER — OFFICE VISIT (OUTPATIENT)
Dept: WOUND CARE | Facility: CLINIC | Age: 29
End: 2024-06-18
Payer: COMMERCIAL

## 2024-06-18 PROCEDURE — 11042 DBRDMT SUBQ TIS 1ST 20SQCM/<: CPT

## 2024-06-20 ENCOUNTER — HOME CARE VISIT (OUTPATIENT)
Dept: HOME HEALTH SERVICES | Facility: HOME HEALTH | Age: 29
End: 2024-06-20
Payer: COMMERCIAL

## 2024-06-20 VITALS
SYSTOLIC BLOOD PRESSURE: 122 MMHG | TEMPERATURE: 98.6 F | HEART RATE: 90 BPM | DIASTOLIC BLOOD PRESSURE: 61 MMHG | OXYGEN SATURATION: 98 % | RESPIRATION RATE: 18 BRPM

## 2024-06-20 PROCEDURE — G0300 HHS/HOSPICE OF LPN EA 15 MIN: HCPCS

## 2024-06-20 ASSESSMENT — ENCOUNTER SYMPTOMS
APPETITE LEVEL: GOOD
LOWEST PAIN SEVERITY IN PAST 24 HOURS: 0/10
PERSON REPORTING PAIN: PATIENT
PAIN SEVERITY GOAL: 0/10
DENIES PAIN: 1
HIGHEST PAIN SEVERITY IN PAST 24 HOURS: 0/10
SUBJECTIVE PAIN PROGRESSION: UNCHANGED
CHANGE IN APPETITE: UNCHANGED

## 2024-06-20 ASSESSMENT — PAIN SCALES - PAIN ASSESSMENT IN ADVANCED DEMENTIA (PAINAD)
FACIALEXPRESSION: 0 - SMILING OR INEXPRESSIVE.
TOTALSCORE: 0
BREATHING: 0
NEGVOCALIZATION: 0
CONSOLABILITY: 0 - NO NEED TO CONSOLE.
BODYLANGUAGE: 0 - RELAXED.
CONSOLABILITY: 0
BODYLANGUAGE: 0
FACIALEXPRESSION: 0
NEGVOCALIZATION: 0 - NONE.

## 2024-06-22 ENCOUNTER — HOME CARE VISIT (OUTPATIENT)
Dept: HOME HEALTH SERVICES | Facility: HOME HEALTH | Age: 29
End: 2024-06-22
Payer: COMMERCIAL

## 2024-06-22 VITALS
DIASTOLIC BLOOD PRESSURE: 80 MMHG | TEMPERATURE: 98.6 F | OXYGEN SATURATION: 98 % | SYSTOLIC BLOOD PRESSURE: 118 MMHG | RESPIRATION RATE: 18 BRPM | HEART RATE: 90 BPM

## 2024-06-22 PROCEDURE — G0300 HHS/HOSPICE OF LPN EA 15 MIN: HCPCS

## 2024-06-22 ASSESSMENT — PAIN SCALES - PAIN ASSESSMENT IN ADVANCED DEMENTIA (PAINAD)
NEGVOCALIZATION: 0 - NONE.
BODYLANGUAGE: 0 - RELAXED.
FACIALEXPRESSION: 0
NEGVOCALIZATION: 0
BREATHING: 0
TOTALSCORE: 0
CONSOLABILITY: 0
CONSOLABILITY: 0 - NO NEED TO CONSOLE.
BODYLANGUAGE: 0
FACIALEXPRESSION: 0 - SMILING OR INEXPRESSIVE.

## 2024-06-22 ASSESSMENT — ENCOUNTER SYMPTOMS
DENIES PAIN: 1
APPETITE LEVEL: GOOD
HIGHEST PAIN SEVERITY IN PAST 24 HOURS: 0/10
BOWEL PATTERN NORMAL: 1
STOOL FREQUENCY: LESS THAN DAILY
CHANGE IN APPETITE: UNCHANGED
LOWEST PAIN SEVERITY IN PAST 24 HOURS: 0/10
LAST BOWEL MOVEMENT: 67012
SUBJECTIVE PAIN PROGRESSION: UNCHANGED
PAIN SEVERITY GOAL: 0/10
PERSON REPORTING PAIN: PATIENT

## 2024-06-25 ENCOUNTER — HOME CARE VISIT (OUTPATIENT)
Dept: HOME HEALTH SERVICES | Facility: HOME HEALTH | Age: 29
End: 2024-06-25
Payer: COMMERCIAL

## 2024-06-25 VITALS
OXYGEN SATURATION: 99 % | TEMPERATURE: 97.6 F | RESPIRATION RATE: 18 BRPM | HEART RATE: 89 BPM | SYSTOLIC BLOOD PRESSURE: 118 MMHG | DIASTOLIC BLOOD PRESSURE: 75 MMHG

## 2024-06-25 PROCEDURE — G0300 HHS/HOSPICE OF LPN EA 15 MIN: HCPCS

## 2024-06-25 ASSESSMENT — ENCOUNTER SYMPTOMS
DENIES PAIN: 1
HIGHEST PAIN SEVERITY IN PAST 24 HOURS: 0/10
CHANGE IN APPETITE: UNCHANGED
APPETITE LEVEL: GOOD
SUBJECTIVE PAIN PROGRESSION: UNCHANGED
PAIN SEVERITY GOAL: 0/10
LOWEST PAIN SEVERITY IN PAST 24 HOURS: 0/10
PERSON REPORTING PAIN: PATIENT

## 2024-06-29 ENCOUNTER — HOME CARE VISIT (OUTPATIENT)
Dept: HOME HEALTH SERVICES | Facility: HOME HEALTH | Age: 29
End: 2024-06-29
Payer: COMMERCIAL

## 2024-07-02 ENCOUNTER — APPOINTMENT (OUTPATIENT)
Dept: WOUND CARE | Facility: CLINIC | Age: 29
End: 2024-07-02
Payer: COMMERCIAL

## 2024-07-03 ENCOUNTER — HOME CARE VISIT (OUTPATIENT)
Dept: HOME HEALTH SERVICES | Facility: HOME HEALTH | Age: 29
End: 2024-07-03
Payer: COMMERCIAL

## 2024-07-03 VITALS
TEMPERATURE: 98 F | HEART RATE: 88 BPM | DIASTOLIC BLOOD PRESSURE: 70 MMHG | RESPIRATION RATE: 18 BRPM | SYSTOLIC BLOOD PRESSURE: 115 MMHG | OXYGEN SATURATION: 98 %

## 2024-07-03 PROCEDURE — G0300 HHS/HOSPICE OF LPN EA 15 MIN: HCPCS

## 2024-07-03 ASSESSMENT — ENCOUNTER SYMPTOMS
APPETITE LEVEL: GOOD
BOWEL PATTERN NORMAL: 1
CHANGE IN APPETITE: UNCHANGED
HIGHEST PAIN SEVERITY IN PAST 24 HOURS: 0/10
STOOL FREQUENCY: LESS THAN DAILY
SUBJECTIVE PAIN PROGRESSION: UNCHANGED
DENIES PAIN: 1
LAST BOWEL MOVEMENT: 67023
PERSON REPORTING PAIN: PATIENT
PAIN SEVERITY GOAL: 0/10
LOWEST PAIN SEVERITY IN PAST 24 HOURS: 0/10

## 2024-07-03 ASSESSMENT — PAIN SCALES - PAIN ASSESSMENT IN ADVANCED DEMENTIA (PAINAD)
CONSOLABILITY: 0
FACIALEXPRESSION: 0 - SMILING OR INEXPRESSIVE.
CONSOLABILITY: 0 - NO NEED TO CONSOLE.
BREATHING: 0
TOTALSCORE: 0
NEGVOCALIZATION: 0
NEGVOCALIZATION: 0 - NONE.
BODYLANGUAGE: 0
BODYLANGUAGE: 0 - RELAXED.
FACIALEXPRESSION: 0

## 2024-07-06 ENCOUNTER — HOME CARE VISIT (OUTPATIENT)
Dept: HOME HEALTH SERVICES | Facility: HOME HEALTH | Age: 29
End: 2024-07-06
Payer: COMMERCIAL

## 2024-07-06 VITALS
RESPIRATION RATE: 18 BRPM | TEMPERATURE: 98.7 F | DIASTOLIC BLOOD PRESSURE: 72 MMHG | HEART RATE: 84 BPM | OXYGEN SATURATION: 99 % | SYSTOLIC BLOOD PRESSURE: 125 MMHG

## 2024-07-06 PROCEDURE — G0300 HHS/HOSPICE OF LPN EA 15 MIN: HCPCS

## 2024-07-06 ASSESSMENT — ENCOUNTER SYMPTOMS
DENIES PAIN: 1
LOWEST PAIN SEVERITY IN PAST 24 HOURS: 0/10
HIGHEST PAIN SEVERITY IN PAST 24 HOURS: 0/10
PERSON REPORTING PAIN: PATIENT
CHANGE IN APPETITE: UNCHANGED
PAIN SEVERITY GOAL: 0/10
SUBJECTIVE PAIN PROGRESSION: UNCHANGED
APPETITE LEVEL: GOOD

## 2024-07-11 ENCOUNTER — HOME CARE VISIT (OUTPATIENT)
Dept: HOME HEALTH SERVICES | Facility: HOME HEALTH | Age: 29
End: 2024-07-11
Payer: COMMERCIAL

## 2024-07-12 ENCOUNTER — TELEPHONE (OUTPATIENT)
Dept: PRIMARY CARE | Facility: CLINIC | Age: 29
End: 2024-07-12
Payer: COMMERCIAL

## 2024-07-12 NOTE — TELEPHONE ENCOUNTER
Can you place orders?  It looks like in past ,we completed forms from company, please advise Darrius

## 2024-07-12 NOTE — TELEPHONE ENCOUNTER
Patient states where he gets his [employer] supply does not take his Ins any more. He found a new place [facility] is asking for an order for Briefs-Pull Ups and gloves be faxed to them Fax#[phone].

## 2024-07-13 ENCOUNTER — HOME CARE VISIT (OUTPATIENT)
Dept: HOME HEALTH SERVICES | Facility: HOME HEALTH | Age: 29
End: 2024-07-13
Payer: COMMERCIAL

## 2024-07-13 VITALS
TEMPERATURE: 97.4 F | HEART RATE: 88 BPM | RESPIRATION RATE: 12 BRPM | DIASTOLIC BLOOD PRESSURE: 76 MMHG | SYSTOLIC BLOOD PRESSURE: 124 MMHG

## 2024-07-13 PROCEDURE — G0299 HHS/HOSPICE OF RN EA 15 MIN: HCPCS

## 2024-07-13 ASSESSMENT — ENCOUNTER SYMPTOMS
MUSCLE WEAKNESS: 1
FATIGUES EASILY: 1
CHANGE IN APPETITE: UNCHANGED
FATIGUE: 1
DENIES PAIN: 1
FORGETFULNESS: 1
APPETITE LEVEL: GOOD
BOWEL INCONTINENCE: 1
STOOL FREQUENCY: DAILY
SUBJECTIVE PAIN PROGRESSION: UNCHANGED
PERSON REPORTING PAIN: PATIENT

## 2024-07-13 ASSESSMENT — ACTIVITIES OF DAILY LIVING (ADL)
AMBULATION ASSISTANCE: 1
AMBULATION ASSISTANCE: NON-AMBULATORY
ENTERING_EXITING_HOME: MODERATE ASSIST

## 2024-07-15 NOTE — TELEPHONE ENCOUNTER
Father wants us to write order and fax to new co. ( I printed old one for you to look at --on your ledge)

## 2024-07-16 ENCOUNTER — APPOINTMENT (OUTPATIENT)
Dept: WOUND CARE | Facility: CLINIC | Age: 29
End: 2024-07-16
Payer: COMMERCIAL

## 2024-07-18 ENCOUNTER — APPOINTMENT (OUTPATIENT)
Dept: WOUND CARE | Facility: CLINIC | Age: 29
End: 2024-07-18
Payer: COMMERCIAL

## 2024-07-20 ENCOUNTER — HOME CARE VISIT (OUTPATIENT)
Dept: HOME HEALTH SERVICES | Facility: HOME HEALTH | Age: 29
End: 2024-07-20
Payer: COMMERCIAL

## 2024-07-20 VITALS
RESPIRATION RATE: 16 BRPM | TEMPERATURE: 98.7 F | SYSTOLIC BLOOD PRESSURE: 130 MMHG | HEART RATE: 99 BPM | OXYGEN SATURATION: 99 % | DIASTOLIC BLOOD PRESSURE: 82 MMHG

## 2024-07-20 PROCEDURE — G0300 HHS/HOSPICE OF LPN EA 15 MIN: HCPCS

## 2024-07-20 ASSESSMENT — ENCOUNTER SYMPTOMS
LAST BOWEL MOVEMENT: 67041
CHANGE IN APPETITE: UNCHANGED
MUSCLE WEAKNESS: 1
APPETITE LEVEL: GOOD
DENIES PAIN: 1
AGITATION: 1

## 2024-07-21 ASSESSMENT — ACTIVITIES OF DAILY LIVING (ADL): OASIS_M1830: 03

## 2024-07-23 ENCOUNTER — HOME CARE VISIT (OUTPATIENT)
Dept: HOME HEALTH SERVICES | Facility: HOME HEALTH | Age: 29
End: 2024-07-23
Payer: COMMERCIAL

## 2024-07-23 ENCOUNTER — OFFICE VISIT (OUTPATIENT)
Dept: WOUND CARE | Facility: CLINIC | Age: 29
End: 2024-07-23
Payer: COMMERCIAL

## 2024-07-23 PROCEDURE — 99213 OFFICE O/P EST LOW 20 MIN: CPT

## 2024-07-23 PROCEDURE — 11043 DBRDMT MUSC&/FSCA 1ST 20/<: CPT

## 2024-07-27 ENCOUNTER — HOME CARE VISIT (OUTPATIENT)
Dept: HOME HEALTH SERVICES | Facility: HOME HEALTH | Age: 29
End: 2024-07-27
Payer: COMMERCIAL

## 2024-07-30 ENCOUNTER — HOME CARE VISIT (OUTPATIENT)
Dept: HOME HEALTH SERVICES | Facility: HOME HEALTH | Age: 29
End: 2024-07-30
Payer: COMMERCIAL

## 2024-08-08 ENCOUNTER — HOME CARE VISIT (OUTPATIENT)
Dept: HOME HEALTH SERVICES | Facility: HOME HEALTH | Age: 29
End: 2024-08-08
Payer: COMMERCIAL

## 2024-08-08 VITALS
OXYGEN SATURATION: 98 % | SYSTOLIC BLOOD PRESSURE: 118 MMHG | DIASTOLIC BLOOD PRESSURE: 70 MMHG | HEART RATE: 90 BPM | RESPIRATION RATE: 16 BRPM | TEMPERATURE: 98.6 F

## 2024-08-08 PROCEDURE — G0300 HHS/HOSPICE OF LPN EA 15 MIN: HCPCS

## 2024-08-08 ASSESSMENT — PAIN SCALES - PAIN ASSESSMENT IN ADVANCED DEMENTIA (PAINAD)
CONSOLABILITY: 0 - NO NEED TO CONSOLE.
BREATHING: 0
FACIALEXPRESSION: 0
CONSOLABILITY: 0
FACIALEXPRESSION: 0 - SMILING OR INEXPRESSIVE.
TOTALSCORE: 0
BODYLANGUAGE: 0 - RELAXED.
BODYLANGUAGE: 0
NEGVOCALIZATION: 0 - NONE.
NEGVOCALIZATION: 0

## 2024-08-08 ASSESSMENT — ENCOUNTER SYMPTOMS
PERSON REPORTING PAIN: PATIENT
CONSTIPATION: 1
STOOL FREQUENCY: LESS THAN DAILY
LAST BOWEL MOVEMENT: 67059
APPETITE LEVEL: GOOD
LOWEST PAIN SEVERITY IN PAST 24 HOURS: 0/10
CHANGE IN APPETITE: UNCHANGED
HIGHEST PAIN SEVERITY IN PAST 24 HOURS: 0/10
SUBJECTIVE PAIN PROGRESSION: UNCHANGED
PAIN SEVERITY GOAL: 0/10
DENIES PAIN: 1

## 2024-08-09 ENCOUNTER — HOME CARE VISIT (OUTPATIENT)
Dept: HOME HEALTH SERVICES | Facility: HOME HEALTH | Age: 29
End: 2024-08-09
Payer: COMMERCIAL

## 2024-08-13 ENCOUNTER — HOME CARE VISIT (OUTPATIENT)
Dept: HOME HEALTH SERVICES | Facility: HOME HEALTH | Age: 29
End: 2024-08-13
Payer: COMMERCIAL

## 2024-08-15 ENCOUNTER — APPOINTMENT (OUTPATIENT)
Dept: WOUND CARE | Facility: CLINIC | Age: 29
End: 2024-08-15
Payer: COMMERCIAL

## 2024-08-16 ENCOUNTER — HOME CARE VISIT (OUTPATIENT)
Dept: HOME HEALTH SERVICES | Facility: HOME HEALTH | Age: 29
End: 2024-08-16
Payer: COMMERCIAL

## 2024-08-16 ENCOUNTER — TELEPHONE (OUTPATIENT)
Dept: PRIMARY CARE | Facility: CLINIC | Age: 29
End: 2024-08-16
Payer: COMMERCIAL

## 2024-08-16 ENCOUNTER — LAB REQUISITION (OUTPATIENT)
Dept: LAB | Facility: HOSPITAL | Age: 29
End: 2024-08-16
Payer: COMMERCIAL

## 2024-08-16 DIAGNOSIS — N39.0 URINARY TRACT INFECTION, SITE NOT SPECIFIED: ICD-10-CM

## 2024-08-16 PROCEDURE — 87086 URINE CULTURE/COLONY COUNT: CPT

## 2024-08-16 PROCEDURE — 87186 SC STD MICRODIL/AGAR DIL: CPT

## 2024-08-16 NOTE — TELEPHONE ENCOUNTER
[facility] An[facility] Called asking what medicine we give to Alexis for UTI's. Release is scanned into his chart./Please Advise

## 2024-08-18 LAB — BACTERIA UR CULT: ABNORMAL

## 2024-08-20 ENCOUNTER — HOME CARE VISIT (OUTPATIENT)
Dept: HOME HEALTH SERVICES | Facility: HOME HEALTH | Age: 29
End: 2024-08-20
Payer: COMMERCIAL

## 2024-08-20 PROCEDURE — G0299 HHS/HOSPICE OF RN EA 15 MIN: HCPCS

## 2024-08-22 ENCOUNTER — APPOINTMENT (OUTPATIENT)
Dept: WOUND CARE | Facility: CLINIC | Age: 29
End: 2024-08-22
Payer: COMMERCIAL

## 2024-08-23 ENCOUNTER — HOME CARE VISIT (OUTPATIENT)
Dept: HOME HEALTH SERVICES | Facility: HOME HEALTH | Age: 29
End: 2024-08-23
Payer: COMMERCIAL

## 2024-08-23 VITALS
TEMPERATURE: 98 F | RESPIRATION RATE: 18 BRPM | OXYGEN SATURATION: 96 % | SYSTOLIC BLOOD PRESSURE: 118 MMHG | HEART RATE: 80 BPM | DIASTOLIC BLOOD PRESSURE: 71 MMHG

## 2024-08-23 PROCEDURE — G0300 HHS/HOSPICE OF LPN EA 15 MIN: HCPCS

## 2024-08-23 SDOH — ECONOMIC STABILITY: GENERAL

## 2024-08-23 ASSESSMENT — ENCOUNTER SYMPTOMS
CHANGE IN APPETITE: UNCHANGED
APPETITE LEVEL: GOOD
CHANGE IN APPETITE: UNCHANGED
APPETITE LEVEL: GOOD
PAIN SEVERITY GOAL: 0/10
MUSCLE WEAKNESS: 1
HIGHEST PAIN SEVERITY IN PAST 24 HOURS: 0/10
SUBJECTIVE PAIN PROGRESSION: UNCHANGED
STOOL FREQUENCY: LESS THAN DAILY
LOWEST PAIN SEVERITY IN PAST 24 HOURS: 0/10
FATIGUE: 1
DENIES PAIN: 1
PERSON REPORTING PAIN: PATIENT
PERSON REPORTING PAIN: PATIENT
LAST BOWEL MOVEMENT: 67074
CONSTIPATION: 1
DENIES PAIN: 1

## 2024-08-23 ASSESSMENT — PAIN SCALES - PAIN ASSESSMENT IN ADVANCED DEMENTIA (PAINAD)
NEGVOCALIZATION: 0
FACIALEXPRESSION: 0
NEGVOCALIZATION: 0 - NONE.
CONSOLABILITY: 0 - NO NEED TO CONSOLE.
TOTALSCORE: 0
BREATHING: 0
BODYLANGUAGE: 0 - RELAXED.
FACIALEXPRESSION: 0 - SMILING OR INEXPRESSIVE.
CONSOLABILITY: 0
BODYLANGUAGE: 0

## 2024-08-23 ASSESSMENT — ACTIVITIES OF DAILY LIVING (ADL)
AMBULATION ASSISTANCE: NON-AMBULATORY
CURRENT_FUNCTION: ONE PERSON
CURRENT_FUNCTION: STAND BY ASSIST
MONEY MANAGEMENT (EXPENSES/BILLS): NEEDS ASSISTANCE

## 2024-08-27 ENCOUNTER — HOME CARE VISIT (OUTPATIENT)
Dept: HOME HEALTH SERVICES | Facility: HOME HEALTH | Age: 29
End: 2024-08-27
Payer: COMMERCIAL

## 2024-08-27 VITALS
DIASTOLIC BLOOD PRESSURE: 88 MMHG | SYSTOLIC BLOOD PRESSURE: 123 MMHG | RESPIRATION RATE: 18 BRPM | TEMPERATURE: 95.7 F | HEART RATE: 84 BPM

## 2024-08-27 PROCEDURE — G0300 HHS/HOSPICE OF LPN EA 15 MIN: HCPCS

## 2024-08-27 ASSESSMENT — ENCOUNTER SYMPTOMS
CHANGE IN APPETITE: UNCHANGED
DENIES PAIN: 1
MUSCLE WEAKNESS: 1
APPETITE LEVEL: GOOD

## 2024-08-30 ENCOUNTER — HOME CARE VISIT (OUTPATIENT)
Dept: HOME HEALTH SERVICES | Facility: HOME HEALTH | Age: 29
End: 2024-08-30
Payer: COMMERCIAL

## 2024-09-03 ENCOUNTER — OFFICE VISIT (OUTPATIENT)
Dept: WOUND CARE | Facility: CLINIC | Age: 29
End: 2024-09-03
Payer: COMMERCIAL

## 2024-09-03 PROCEDURE — 99213 OFFICE O/P EST LOW 20 MIN: CPT | Mod: 25

## 2024-09-03 PROCEDURE — 11043 DBRDMT MUSC&/FSCA 1ST 20/<: CPT

## 2024-09-05 ENCOUNTER — HOME CARE VISIT (OUTPATIENT)
Dept: HOME HEALTH SERVICES | Facility: HOME HEALTH | Age: 29
End: 2024-09-05
Payer: COMMERCIAL

## 2024-09-07 ENCOUNTER — HOME CARE VISIT (OUTPATIENT)
Dept: HOME HEALTH SERVICES | Facility: HOME HEALTH | Age: 29
End: 2024-09-07
Payer: COMMERCIAL

## 2024-09-10 ENCOUNTER — HOME CARE VISIT (OUTPATIENT)
Dept: HOME HEALTH SERVICES | Facility: HOME HEALTH | Age: 29
End: 2024-09-10
Payer: COMMERCIAL

## 2024-09-10 PROCEDURE — G0299 HHS/HOSPICE OF RN EA 15 MIN: HCPCS

## 2024-09-14 ENCOUNTER — HOME CARE VISIT (OUTPATIENT)
Dept: HOME HEALTH SERVICES | Facility: HOME HEALTH | Age: 29
End: 2024-09-14
Payer: COMMERCIAL

## 2024-09-17 ENCOUNTER — OFFICE VISIT (OUTPATIENT)
Dept: WOUND CARE | Facility: CLINIC | Age: 29
End: 2024-09-17
Payer: COMMERCIAL

## 2024-09-17 DIAGNOSIS — L89.324: Primary | ICD-10-CM

## 2024-09-17 PROCEDURE — 11044 DBRDMT BONE 1ST 20 SQ CM/<: CPT

## 2024-09-19 ENCOUNTER — HOME CARE VISIT (OUTPATIENT)
Dept: HOME HEALTH SERVICES | Facility: HOME HEALTH | Age: 29
End: 2024-09-19
Payer: COMMERCIAL

## 2024-09-19 VITALS
DIASTOLIC BLOOD PRESSURE: 70 MMHG | HEART RATE: 100 BPM | RESPIRATION RATE: 18 BRPM | TEMPERATURE: 98 F | OXYGEN SATURATION: 99 % | SYSTOLIC BLOOD PRESSURE: 133 MMHG

## 2024-09-19 PROCEDURE — G0300 HHS/HOSPICE OF LPN EA 15 MIN: HCPCS

## 2024-09-19 ASSESSMENT — PAIN SCALES - PAIN ASSESSMENT IN ADVANCED DEMENTIA (PAINAD)
BREATHING: 0
NEGVOCALIZATION: 0
FACIALEXPRESSION: 0 - SMILING OR INEXPRESSIVE.
NEGVOCALIZATION: 0 - NONE.
TOTALSCORE: 0
BODYLANGUAGE: 0 - RELAXED.
CONSOLABILITY: 0 - NO NEED TO CONSOLE.
FACIALEXPRESSION: 0
BODYLANGUAGE: 0
CONSOLABILITY: 0

## 2024-09-19 ASSESSMENT — ENCOUNTER SYMPTOMS
HIGHEST PAIN SEVERITY IN PAST 24 HOURS: 0/10
CHANGE IN APPETITE: UNCHANGED
PERSON REPORTING PAIN: PATIENT
PAIN SEVERITY GOAL: 0/10
APPETITE LEVEL: GOOD
DENIES PAIN: 1
MUSCLE WEAKNESS: 1
SUBJECTIVE PAIN PROGRESSION: UNCHANGED
LOWEST PAIN SEVERITY IN PAST 24 HOURS: 0/10

## 2024-09-21 VITALS
SYSTOLIC BLOOD PRESSURE: 128 MMHG | RESPIRATION RATE: 12 BRPM | HEART RATE: 96 BPM | DIASTOLIC BLOOD PRESSURE: 74 MMHG | TEMPERATURE: 97.2 F | OXYGEN SATURATION: 98 %

## 2024-09-21 ASSESSMENT — ENCOUNTER SYMPTOMS
PERSON REPORTING PAIN: PATIENT
PAIN: PATIENT DENIES PAIN
DENIES PAIN: 1
SUBJECTIVE PAIN PROGRESSION: UNCHANGED
APPETITE LEVEL: GOOD
BOWEL INCONTINENCE: 1
LAST BOWEL MOVEMENT: 67093
AGITATION: 1
FATIGUES EASILY: 1
MUSCLE WEAKNESS: 1
CHANGE IN APPETITE: UNCHANGED
STOOL FREQUENCY: DAILY

## 2024-09-21 ASSESSMENT — ACTIVITIES OF DAILY LIVING (ADL)
CURRENT_FUNCTION: ONE PERSON
AMBULATION ASSISTANCE: NON-AMBULATORY
OASIS_M1830: 03
ENTERING_EXITING_HOME: MODERATE ASSIST

## 2024-09-24 ENCOUNTER — HOME CARE VISIT (OUTPATIENT)
Dept: HOME HEALTH SERVICES | Facility: HOME HEALTH | Age: 29
End: 2024-09-24
Payer: COMMERCIAL

## 2024-09-27 ENCOUNTER — HOSPITAL ENCOUNTER (OUTPATIENT)
Dept: RADIOLOGY | Facility: CLINIC | Age: 29
Discharge: HOME | End: 2024-09-27
Payer: COMMERCIAL

## 2024-09-27 ENCOUNTER — HOME CARE VISIT (OUTPATIENT)
Dept: HOME HEALTH SERVICES | Facility: HOME HEALTH | Age: 29
End: 2024-09-27
Payer: COMMERCIAL

## 2024-09-27 VITALS
DIASTOLIC BLOOD PRESSURE: 80 MMHG | HEART RATE: 95 BPM | OXYGEN SATURATION: 97 % | SYSTOLIC BLOOD PRESSURE: 127 MMHG | RESPIRATION RATE: 18 BRPM | TEMPERATURE: 98 F

## 2024-09-27 DIAGNOSIS — L89.324: ICD-10-CM

## 2024-09-27 PROCEDURE — G0300 HHS/HOSPICE OF LPN EA 15 MIN: HCPCS

## 2024-09-27 PROCEDURE — 72192 CT PELVIS W/O DYE: CPT

## 2024-09-27 ASSESSMENT — ENCOUNTER SYMPTOMS
APPETITE LEVEL: GOOD
PERSON REPORTING PAIN: PATIENT
BOWEL PATTERN NORMAL: 1
CHANGE IN APPETITE: UNCHANGED
DENIES PAIN: 1
STOOL FREQUENCY: LESS THAN DAILY
HIGHEST PAIN SEVERITY IN PAST 24 HOURS: 0/10
SUBJECTIVE PAIN PROGRESSION: UNCHANGED
LAST BOWEL MOVEMENT: 67109
PAIN SEVERITY GOAL: 0/10
LOWEST PAIN SEVERITY IN PAST 24 HOURS: 0/10

## 2024-09-27 ASSESSMENT — PAIN SCALES - PAIN ASSESSMENT IN ADVANCED DEMENTIA (PAINAD)
NEGVOCALIZATION: 0
BODYLANGUAGE: 0 - RELAXED.
NEGVOCALIZATION: 0 - NONE.
FACIALEXPRESSION: 0 - SMILING OR INEXPRESSIVE.
TOTALSCORE: 0
BREATHING: 0
CONSOLABILITY: 0
CONSOLABILITY: 0 - NO NEED TO CONSOLE.
BODYLANGUAGE: 0
FACIALEXPRESSION: 0

## 2024-10-01 ENCOUNTER — OFFICE VISIT (OUTPATIENT)
Dept: WOUND CARE | Facility: CLINIC | Age: 29
End: 2024-10-01
Payer: COMMERCIAL

## 2024-10-01 DIAGNOSIS — L89.324: Primary | ICD-10-CM

## 2024-10-01 PROCEDURE — 11043 DBRDMT MUSC&/FSCA 1ST 20/<: CPT

## 2024-10-02 ENCOUNTER — HOME CARE VISIT (OUTPATIENT)
Dept: HOME HEALTH SERVICES | Facility: HOME HEALTH | Age: 29
End: 2024-10-02
Payer: COMMERCIAL

## 2024-10-04 ENCOUNTER — HOME CARE VISIT (OUTPATIENT)
Dept: HOME HEALTH SERVICES | Facility: HOME HEALTH | Age: 29
End: 2024-10-04
Payer: COMMERCIAL

## 2024-10-06 ENCOUNTER — HOME CARE VISIT (OUTPATIENT)
Dept: HOME HEALTH SERVICES | Facility: HOME HEALTH | Age: 29
End: 2024-10-06
Payer: COMMERCIAL

## 2024-10-08 ENCOUNTER — HOME CARE VISIT (OUTPATIENT)
Dept: HOME HEALTH SERVICES | Facility: HOME HEALTH | Age: 29
End: 2024-10-08
Payer: COMMERCIAL

## 2024-10-10 ENCOUNTER — APPOINTMENT (OUTPATIENT)
Dept: RADIOLOGY | Facility: HOSPITAL | Age: 29
End: 2024-10-10
Payer: COMMERCIAL

## 2024-10-10 ENCOUNTER — APPOINTMENT (OUTPATIENT)
Dept: WOUND CARE | Facility: CLINIC | Age: 29
End: 2024-10-10
Payer: COMMERCIAL

## 2024-10-11 ENCOUNTER — HOME CARE VISIT (OUTPATIENT)
Dept: HOME HEALTH SERVICES | Facility: HOME HEALTH | Age: 29
End: 2024-10-11
Payer: COMMERCIAL

## 2024-10-13 ASSESSMENT — PAIN SCALES - PAIN ASSESSMENT IN ADVANCED DEMENTIA (PAINAD)
BREATHING: 0
CONSOLABILITY: 0 - NO NEED TO CONSOLE.
BODYLANGUAGE: 0 - RELAXED.
BODYLANGUAGE: 0
FACIALEXPRESSION: 0
CONSOLABILITY: 0
NEGVOCALIZATION: 0
FACIALEXPRESSION: 0 - SMILING OR INEXPRESSIVE.
TOTALSCORE: 0
NEGVOCALIZATION: 0 - NONE.

## 2024-10-13 ASSESSMENT — ENCOUNTER SYMPTOMS
LOWEST PAIN SEVERITY IN PAST 24 HOURS: 0/10
DENIES PAIN: 1
HIGHEST PAIN SEVERITY IN PAST 24 HOURS: 0/10
PERSON REPORTING PAIN: PATIENT
PAIN SEVERITY GOAL: 0/10
LAST BOWEL MOVEMENT: 67123
SUBJECTIVE PAIN PROGRESSION: UNCHANGED
STOOL FREQUENCY: DAILY
BOWEL PATTERN NORMAL: 1

## 2024-10-15 ENCOUNTER — HOSPITAL ENCOUNTER (OUTPATIENT)
Dept: RADIOLOGY | Facility: CLINIC | Age: 29
End: 2024-10-15
Payer: COMMERCIAL

## 2024-10-16 ENCOUNTER — HOME CARE VISIT (OUTPATIENT)
Dept: HOME HEALTH SERVICES | Facility: HOME HEALTH | Age: 29
End: 2024-10-16
Payer: COMMERCIAL

## 2024-10-17 ENCOUNTER — HOSPITAL ENCOUNTER (OUTPATIENT)
Dept: RADIOLOGY | Facility: CLINIC | Age: 29
End: 2024-10-17
Payer: COMMERCIAL

## 2024-10-18 ENCOUNTER — HOME CARE VISIT (OUTPATIENT)
Dept: HOME HEALTH SERVICES | Facility: HOME HEALTH | Age: 29
End: 2024-10-18
Payer: COMMERCIAL

## 2024-10-22 ENCOUNTER — OFFICE VISIT (OUTPATIENT)
Dept: WOUND CARE | Facility: CLINIC | Age: 29
End: 2024-10-22
Payer: COMMERCIAL

## 2024-10-22 ENCOUNTER — HOME CARE VISIT (OUTPATIENT)
Dept: HOME HEALTH SERVICES | Facility: HOME HEALTH | Age: 29
End: 2024-10-22
Payer: COMMERCIAL

## 2024-10-22 PROCEDURE — 11043 DBRDMT MUSC&/FSCA 1ST 20/<: CPT

## 2024-10-25 ENCOUNTER — HOME CARE VISIT (OUTPATIENT)
Dept: HOME HEALTH SERVICES | Facility: HOME HEALTH | Age: 29
End: 2024-10-25
Payer: COMMERCIAL

## 2024-10-25 VITALS
RESPIRATION RATE: 18 BRPM | SYSTOLIC BLOOD PRESSURE: 119 MMHG | OXYGEN SATURATION: 98 % | HEART RATE: 90 BPM | TEMPERATURE: 98.6 F | DIASTOLIC BLOOD PRESSURE: 70 MMHG

## 2024-10-25 PROCEDURE — G0300 HHS/HOSPICE OF LPN EA 15 MIN: HCPCS

## 2024-10-25 ASSESSMENT — ENCOUNTER SYMPTOMS
DENIES PAIN: 1
CHANGE IN APPETITE: UNCHANGED
BOWEL PATTERN NORMAL: 1
STOOL FREQUENCY: DAILY
PERSON REPORTING PAIN: PATIENT
LAST BOWEL MOVEMENT: 67138
SUBJECTIVE PAIN PROGRESSION: UNCHANGED
LOWEST PAIN SEVERITY IN PAST 24 HOURS: 0/10
PAIN SEVERITY GOAL: 0/10
HIGHEST PAIN SEVERITY IN PAST 24 HOURS: 0/10

## 2024-10-25 ASSESSMENT — PAIN SCALES - PAIN ASSESSMENT IN ADVANCED DEMENTIA (PAINAD)
CONSOLABILITY: 0
BODYLANGUAGE: 0
BODYLANGUAGE: 0 - RELAXED.
FACIALEXPRESSION: 0
BREATHING: 0
NEGVOCALIZATION: 0
TOTALSCORE: 0
NEGVOCALIZATION: 0 - NONE.
CONSOLABILITY: 0 - NO NEED TO CONSOLE.
FACIALEXPRESSION: 0 - SMILING OR INEXPRESSIVE.

## 2024-10-30 ENCOUNTER — HOME CARE VISIT (OUTPATIENT)
Dept: HOME HEALTH SERVICES | Facility: HOME HEALTH | Age: 29
End: 2024-10-30
Payer: COMMERCIAL

## 2024-10-30 VITALS
SYSTOLIC BLOOD PRESSURE: 160 MMHG | HEART RATE: 106 BPM | DIASTOLIC BLOOD PRESSURE: 100 MMHG | OXYGEN SATURATION: 96 % | RESPIRATION RATE: 16 BRPM | TEMPERATURE: 97.3 F

## 2024-10-30 PROCEDURE — G0299 HHS/HOSPICE OF RN EA 15 MIN: HCPCS

## 2024-10-30 ASSESSMENT — ENCOUNTER SYMPTOMS
CHANGE IN APPETITE: UNCHANGED
APPETITE LEVEL: GOOD
DENIES PAIN: 1
MUSCLE WEAKNESS: 1
PERSON REPORTING PAIN: PATIENT

## 2024-11-01 ENCOUNTER — HOME CARE VISIT (OUTPATIENT)
Dept: HOME HEALTH SERVICES | Facility: HOME HEALTH | Age: 29
End: 2024-11-01
Payer: COMMERCIAL

## 2024-11-05 ENCOUNTER — OFFICE VISIT (OUTPATIENT)
Dept: WOUND CARE | Facility: CLINIC | Age: 29
End: 2024-11-05
Payer: COMMERCIAL

## 2024-11-05 PROCEDURE — 87070 CULTURE OTHR SPECIMN AEROBIC: CPT | Mod: OUT | Performed by: NURSE PRACTITIONER

## 2024-11-05 PROCEDURE — 11043 DBRDMT MUSC&/FSCA 1ST 20/<: CPT

## 2024-11-06 ENCOUNTER — LAB REQUISITION (OUTPATIENT)
Dept: LAB | Facility: HOSPITAL | Age: 29
End: 2024-11-06
Payer: COMMERCIAL

## 2024-11-06 DIAGNOSIS — L89.324 PRESSURE ULCER OF LEFT BUTTOCK, STAGE 4 (MULTI): ICD-10-CM

## 2024-11-08 ENCOUNTER — HOME CARE VISIT (OUTPATIENT)
Dept: HOME HEALTH SERVICES | Facility: HOME HEALTH | Age: 29
End: 2024-11-08
Payer: COMMERCIAL

## 2024-11-08 LAB
B-LACTAMASE ORGANISM ISLT: POSITIVE
BACTERIA SPEC CULT: ABNORMAL
BACTERIA SPEC CULT: ABNORMAL
GRAM STN SPEC: ABNORMAL
GRAM STN SPEC: ABNORMAL

## 2024-11-11 ENCOUNTER — HOME CARE VISIT (OUTPATIENT)
Dept: HOME HEALTH SERVICES | Facility: HOME HEALTH | Age: 29
End: 2024-11-11
Payer: COMMERCIAL

## 2024-11-11 VITALS
HEART RATE: 82 BPM | DIASTOLIC BLOOD PRESSURE: 78 MMHG | TEMPERATURE: 97.4 F | RESPIRATION RATE: 12 BRPM | OXYGEN SATURATION: 99 % | SYSTOLIC BLOOD PRESSURE: 134 MMHG

## 2024-11-11 PROCEDURE — G0299 HHS/HOSPICE OF RN EA 15 MIN: HCPCS

## 2024-11-11 ASSESSMENT — ENCOUNTER SYMPTOMS
LOWEST PAIN SEVERITY IN PAST 24 HOURS: 1/10
DENIES PAIN: 1
HIGHEST PAIN SEVERITY IN PAST 24 HOURS: 2/10
SUBJECTIVE PAIN PROGRESSION: UNCHANGED
PERSON REPORTING PAIN: PATIENT
PAIN SEVERITY GOAL: 0/10

## 2024-11-11 ASSESSMENT — ACTIVITIES OF DAILY LIVING (ADL): ENTERING_EXITING_HOME: MODERATE ASSIST

## 2024-11-13 ASSESSMENT — ENCOUNTER SYMPTOMS
FATIGUE: 1
STOOL FREQUENCY: DAILY
FATIGUES EASILY: 1
BOWEL INCONTINENCE: 1
MUSCLE WEAKNESS: 1
CHANGE IN APPETITE: UNCHANGED
APPETITE LEVEL: GOOD

## 2024-11-13 ASSESSMENT — ACTIVITIES OF DAILY LIVING (ADL)
CURRENT_FUNCTION: ONE PERSON
AMBULATION ASSISTANCE: NON-AMBULATORY
OASIS_M1830: 03

## 2024-11-15 ENCOUNTER — HOME CARE VISIT (OUTPATIENT)
Dept: HOME HEALTH SERVICES | Facility: HOME HEALTH | Age: 29
End: 2024-11-15
Payer: COMMERCIAL

## 2024-11-15 PROCEDURE — G0300 HHS/HOSPICE OF LPN EA 15 MIN: HCPCS

## 2024-11-15 PROCEDURE — 400014 HH F/U

## 2024-11-17 VITALS
RESPIRATION RATE: 8 BRPM | TEMPERATURE: 98 F | SYSTOLIC BLOOD PRESSURE: 118 MMHG | HEART RATE: 88 BPM | OXYGEN SATURATION: 98 % | DIASTOLIC BLOOD PRESSURE: 80 MMHG

## 2024-11-17 PROCEDURE — G0179 MD RECERTIFICATION HHA PT: HCPCS | Performed by: PHYSICIAN ASSISTANT

## 2024-11-17 ASSESSMENT — ENCOUNTER SYMPTOMS
PAIN SEVERITY GOAL: 0/10
SUBJECTIVE PAIN PROGRESSION: UNCHANGED
CHANGE IN APPETITE: UNCHANGED
LOWEST PAIN SEVERITY IN PAST 24 HOURS: 0/10
DENIES PAIN: 1
HIGHEST PAIN SEVERITY IN PAST 24 HOURS: 0/10
BOWEL PATTERN NORMAL: 1
APPETITE LEVEL: GOOD
PERSON REPORTING PAIN: PATIENT
STOOL FREQUENCY: DAILY
LAST BOWEL MOVEMENT: 67158

## 2024-11-17 ASSESSMENT — PAIN SCALES - PAIN ASSESSMENT IN ADVANCED DEMENTIA (PAINAD)
CONSOLABILITY: 0 - NO NEED TO CONSOLE.
FACIALEXPRESSION: 0
NEGVOCALIZATION: 0 - NONE.
NEGVOCALIZATION: 0
BODYLANGUAGE: 0
BREATHING: 0
CONSOLABILITY: 0
FACIALEXPRESSION: 0 - SMILING OR INEXPRESSIVE.
TOTALSCORE: 0
BODYLANGUAGE: 0 - RELAXED.

## 2024-11-18 ENCOUNTER — HOME CARE VISIT (OUTPATIENT)
Dept: HOME HEALTH SERVICES | Facility: HOME HEALTH | Age: 29
End: 2024-11-18
Payer: COMMERCIAL

## 2024-11-19 ENCOUNTER — APPOINTMENT (OUTPATIENT)
Dept: WOUND CARE | Facility: CLINIC | Age: 29
End: 2024-11-19
Payer: COMMERCIAL

## 2024-11-21 ENCOUNTER — HOME CARE VISIT (OUTPATIENT)
Dept: HOME HEALTH SERVICES | Facility: HOME HEALTH | Age: 29
End: 2024-11-21
Payer: COMMERCIAL

## 2024-11-21 VITALS
DIASTOLIC BLOOD PRESSURE: 64 MMHG | TEMPERATURE: 97.5 F | OXYGEN SATURATION: 99 % | SYSTOLIC BLOOD PRESSURE: 110 MMHG | HEART RATE: 74 BPM | RESPIRATION RATE: 12 BRPM

## 2024-11-21 PROCEDURE — G0299 HHS/HOSPICE OF RN EA 15 MIN: HCPCS

## 2024-11-21 SDOH — ECONOMIC STABILITY: GENERAL

## 2024-11-21 ASSESSMENT — ENCOUNTER SYMPTOMS
LOWEST PAIN SEVERITY IN PAST 24 HOURS: 0/10
SUBJECTIVE PAIN PROGRESSION: UNCHANGED
DENIES PAIN: 1
PERSON REPORTING PAIN: PATIENT
LIMITED RANGE OF MOTION: 1
CHANGE IN APPETITE: UNCHANGED
FATIGUE: 1
MUSCLE WEAKNESS: 1
STOOL FREQUENCY: DAILY
PAIN SEVERITY GOAL: 0/10
APPETITE LEVEL: GOOD
HIGHEST PAIN SEVERITY IN PAST 24 HOURS: 0/10
BOWEL INCONTINENCE: 1

## 2024-11-21 ASSESSMENT — ACTIVITIES OF DAILY LIVING (ADL)
CURRENT_FUNCTION: ONE PERSON
MONEY MANAGEMENT (EXPENSES/BILLS): NEEDS ASSISTANCE
AMBULATION ASSISTANCE: NON-AMBULATORY

## 2024-11-25 ENCOUNTER — HOME CARE VISIT (OUTPATIENT)
Dept: HOME HEALTH SERVICES | Facility: HOME HEALTH | Age: 29
End: 2024-11-25
Payer: COMMERCIAL

## 2024-11-25 ENCOUNTER — HOSPITAL ENCOUNTER (OUTPATIENT)
Dept: RADIOLOGY | Facility: CLINIC | Age: 29
Discharge: HOME | End: 2024-11-25
Payer: COMMERCIAL

## 2024-11-25 DIAGNOSIS — L89.324: ICD-10-CM

## 2024-11-25 PROCEDURE — 72195 MRI PELVIS W/O DYE: CPT | Performed by: RADIOLOGY

## 2024-11-25 PROCEDURE — 72195 MRI PELVIS W/O DYE: CPT

## 2024-11-25 ASSESSMENT — ENCOUNTER SYMPTOMS
LOSS OF SENSATION IN FEET: 0
OCCASIONAL FEELINGS OF UNSTEADINESS: 0
DEPRESSION: 0

## 2024-11-26 ENCOUNTER — OFFICE VISIT (OUTPATIENT)
Dept: WOUND CARE | Facility: CLINIC | Age: 29
End: 2024-11-26
Payer: COMMERCIAL

## 2024-11-26 PROCEDURE — 11043 DBRDMT MUSC&/FSCA 1ST 20/<: CPT

## 2024-11-30 ENCOUNTER — HOME CARE VISIT (OUTPATIENT)
Dept: HOME HEALTH SERVICES | Facility: HOME HEALTH | Age: 29
End: 2024-11-30
Payer: COMMERCIAL

## 2024-11-30 VITALS
HEART RATE: 80 BPM | OXYGEN SATURATION: 96 % | DIASTOLIC BLOOD PRESSURE: 70 MMHG | SYSTOLIC BLOOD PRESSURE: 124 MMHG | RESPIRATION RATE: 16 BRPM | TEMPERATURE: 98.1 F

## 2024-11-30 PROCEDURE — G0299 HHS/HOSPICE OF RN EA 15 MIN: HCPCS

## 2024-11-30 ASSESSMENT — ENCOUNTER SYMPTOMS
SKIN LESIONS: 1
DENIES PAIN: 1
CHANGE IN APPETITE: UNCHANGED
APPETITE LEVEL: FAIR

## 2024-12-02 ENCOUNTER — HOME CARE VISIT (OUTPATIENT)
Dept: HOME HEALTH SERVICES | Facility: HOME HEALTH | Age: 29
End: 2024-12-02
Payer: COMMERCIAL

## 2024-12-02 VITALS
DIASTOLIC BLOOD PRESSURE: 58 MMHG | OXYGEN SATURATION: 98 % | SYSTOLIC BLOOD PRESSURE: 120 MMHG | TEMPERATURE: 97.5 F | HEART RATE: 76 BPM | RESPIRATION RATE: 12 BRPM

## 2024-12-02 PROCEDURE — G0299 HHS/HOSPICE OF RN EA 15 MIN: HCPCS

## 2024-12-02 SDOH — ECONOMIC STABILITY: GENERAL

## 2024-12-02 ASSESSMENT — ENCOUNTER SYMPTOMS
PAIN LOCATION: GENERALIZED
PAIN LOCATION - PAIN DURATION: VARIES
LOWEST PAIN SEVERITY IN PAST 24 HOURS: 2/10
PAIN LOCATION - RELIEVING FACTORS: MEDICATION REST
FATIGUE: 1
HIGHEST PAIN SEVERITY IN PAST 24 HOURS: 4/10
PAIN SEVERITY GOAL: 1/10
LAST BOWEL MOVEMENT: 67176
BOWEL INCONTINENCE: 1
PAIN: 1
CHANGE IN APPETITE: UNCHANGED
PAIN LOCATION - EXACERBATING FACTORS: SPINA BIFIDA
PERSON REPORTING PAIN: PATIENT
PAIN LOCATION - PAIN FREQUENCY: INTERMITTENT
APPETITE LEVEL: GOOD
PAIN LOCATION - PAIN SEVERITY: 3/10
LIMITED RANGE OF MOTION: 1
MUSCLE WEAKNESS: 1
SUBJECTIVE PAIN PROGRESSION: UNCHANGED
PAIN LOCATION - PAIN QUALITY: ACHING, THROBBING
STOOL FREQUENCY: DAILY

## 2024-12-04 ENCOUNTER — APPOINTMENT (OUTPATIENT)
Dept: RADIOLOGY | Facility: HOSPITAL | Age: 29
End: 2024-12-04
Payer: COMMERCIAL

## 2024-12-04 ENCOUNTER — HOSPITAL ENCOUNTER (INPATIENT)
Facility: HOSPITAL | Age: 29
LOS: 2 days | Discharge: HOME | End: 2024-12-06
Attending: STUDENT IN AN ORGANIZED HEALTH CARE EDUCATION/TRAINING PROGRAM | Admitting: INTERNAL MEDICINE
Payer: COMMERCIAL

## 2024-12-04 DIAGNOSIS — M86.9 OSTEOMYELITIS, UNSPECIFIED SITE, UNSPECIFIED TYPE (MULTI): Primary | ICD-10-CM

## 2024-12-04 PROBLEM — D75.839 THROMBOCYTOSIS: Status: ACTIVE | Noted: 2022-10-11

## 2024-12-04 LAB
ALBUMIN SERPL BCP-MCNC: 3.7 G/DL (ref 3.4–5)
ALP SERPL-CCNC: 65 U/L (ref 33–120)
ALT SERPL W P-5'-P-CCNC: 6 U/L (ref 10–52)
ANION GAP SERPL CALC-SCNC: 12 MMOL/L (ref 10–20)
APPEARANCE UR: ABNORMAL
AST SERPL W P-5'-P-CCNC: 8 U/L (ref 9–39)
BACTERIA #/AREA URNS AUTO: ABNORMAL /HPF
BASOPHILS # BLD AUTO: 0.07 X10*3/UL (ref 0–0.1)
BASOPHILS NFR BLD AUTO: 0.6 %
BILIRUB SERPL-MCNC: 0.3 MG/DL (ref 0–1.2)
BILIRUB UR STRIP.AUTO-MCNC: NEGATIVE MG/DL
BUN SERPL-MCNC: 7 MG/DL (ref 6–23)
CALCIUM SERPL-MCNC: 9.2 MG/DL (ref 8.6–10.3)
CHLORIDE SERPL-SCNC: 102 MMOL/L (ref 98–107)
CO2 SERPL-SCNC: 27 MMOL/L (ref 21–32)
COLOR UR: ABNORMAL
CREAT SERPL-MCNC: 0.65 MG/DL (ref 0.5–1.3)
CRP SERPL-MCNC: 18.15 MG/DL
EGFRCR SERPLBLD CKD-EPI 2021: >90 ML/MIN/1.73M*2
EOSINOPHIL # BLD AUTO: 0.17 X10*3/UL (ref 0–0.7)
EOSINOPHIL NFR BLD AUTO: 1.5 %
ERYTHROCYTE [DISTWIDTH] IN BLOOD BY AUTOMATED COUNT: 14.3 % (ref 11.5–14.5)
ERYTHROCYTE [SEDIMENTATION RATE] IN BLOOD BY WESTERGREN METHOD: 83 MM/H (ref 0–15)
GLUCOSE SERPL-MCNC: 97 MG/DL (ref 74–99)
GLUCOSE UR STRIP.AUTO-MCNC: NORMAL MG/DL
HCT VFR BLD AUTO: 38.9 % (ref 41–52)
HGB BLD-MCNC: 12.4 G/DL (ref 13.5–17.5)
IMM GRANULOCYTES # BLD AUTO: 0.04 X10*3/UL (ref 0–0.7)
IMM GRANULOCYTES NFR BLD AUTO: 0.4 % (ref 0–0.9)
KETONES UR STRIP.AUTO-MCNC: NEGATIVE MG/DL
LACTATE SERPL-SCNC: 0.7 MMOL/L (ref 0.4–2)
LEUKOCYTE ESTERASE UR QL STRIP.AUTO: ABNORMAL
LYMPHOCYTES # BLD AUTO: 1.53 X10*3/UL (ref 1.2–4.8)
LYMPHOCYTES NFR BLD AUTO: 13.4 %
MAGNESIUM SERPL-MCNC: 2.16 MG/DL (ref 1.6–2.4)
MCH RBC QN AUTO: 23.2 PG (ref 26–34)
MCHC RBC AUTO-ENTMCNC: 31.9 G/DL (ref 32–36)
MCV RBC AUTO: 73 FL (ref 80–100)
MONOCYTES # BLD AUTO: 1.14 X10*3/UL (ref 0.1–1)
MONOCYTES NFR BLD AUTO: 10 %
MUCOUS THREADS #/AREA URNS AUTO: ABNORMAL /LPF
NEUTROPHILS # BLD AUTO: 8.44 X10*3/UL (ref 1.2–7.7)
NEUTROPHILS NFR BLD AUTO: 74.1 %
NITRITE UR QL STRIP.AUTO: NEGATIVE
NRBC BLD-RTO: 0 /100 WBCS (ref 0–0)
PH UR STRIP.AUTO: 8 [PH]
PLATELET # BLD AUTO: 541 X10*3/UL (ref 150–450)
POTASSIUM SERPL-SCNC: 3.7 MMOL/L (ref 3.5–5.3)
PROT SERPL-MCNC: 7.4 G/DL (ref 6.4–8.2)
PROT UR STRIP.AUTO-MCNC: ABNORMAL MG/DL
RBC # BLD AUTO: 5.35 X10*6/UL (ref 4.5–5.9)
RBC # UR STRIP.AUTO: NEGATIVE /UL
RBC #/AREA URNS AUTO: ABNORMAL /HPF
SODIUM SERPL-SCNC: 137 MMOL/L (ref 136–145)
SP GR UR STRIP.AUTO: 1.01
SQUAMOUS #/AREA URNS AUTO: ABNORMAL /HPF
TRI-PHOS CRY #/AREA UR COMP ASSIST: ABNORMAL /HPF
UROBILINOGEN UR STRIP.AUTO-MCNC: NORMAL MG/DL
WBC # BLD AUTO: 11.4 X10*3/UL (ref 4.4–11.3)
WBC #/AREA URNS AUTO: >50 /HPF

## 2024-12-04 PROCEDURE — 87040 BLOOD CULTURE FOR BACTERIA: CPT | Mod: PORLAB | Performed by: STUDENT IN AN ORGANIZED HEALTH CARE EDUCATION/TRAINING PROGRAM

## 2024-12-04 PROCEDURE — 99285 EMERGENCY DEPT VISIT HI MDM: CPT | Mod: 25 | Performed by: STUDENT IN AN ORGANIZED HEALTH CARE EDUCATION/TRAINING PROGRAM

## 2024-12-04 PROCEDURE — 80053 COMPREHEN METABOLIC PANEL: CPT | Performed by: STUDENT IN AN ORGANIZED HEALTH CARE EDUCATION/TRAINING PROGRAM

## 2024-12-04 PROCEDURE — 96366 THER/PROPH/DIAG IV INF ADDON: CPT

## 2024-12-04 PROCEDURE — 85652 RBC SED RATE AUTOMATED: CPT | Performed by: STUDENT IN AN ORGANIZED HEALTH CARE EDUCATION/TRAINING PROGRAM

## 2024-12-04 PROCEDURE — 1100000001 HC PRIVATE ROOM DAILY

## 2024-12-04 PROCEDURE — 96365 THER/PROPH/DIAG IV INF INIT: CPT

## 2024-12-04 PROCEDURE — 2500000004 HC RX 250 GENERAL PHARMACY W/ HCPCS (ALT 636 FOR OP/ED)

## 2024-12-04 PROCEDURE — 36415 COLL VENOUS BLD VENIPUNCTURE: CPT | Performed by: STUDENT IN AN ORGANIZED HEALTH CARE EDUCATION/TRAINING PROGRAM

## 2024-12-04 PROCEDURE — 2550000001 HC RX 255 CONTRASTS: Performed by: STUDENT IN AN ORGANIZED HEALTH CARE EDUCATION/TRAINING PROGRAM

## 2024-12-04 PROCEDURE — 85025 COMPLETE CBC W/AUTO DIFF WBC: CPT | Performed by: STUDENT IN AN ORGANIZED HEALTH CARE EDUCATION/TRAINING PROGRAM

## 2024-12-04 PROCEDURE — 83605 ASSAY OF LACTIC ACID: CPT | Performed by: STUDENT IN AN ORGANIZED HEALTH CARE EDUCATION/TRAINING PROGRAM

## 2024-12-04 PROCEDURE — 2500000005 HC RX 250 GENERAL PHARMACY W/O HCPCS: Performed by: STUDENT IN AN ORGANIZED HEALTH CARE EDUCATION/TRAINING PROGRAM

## 2024-12-04 PROCEDURE — 74177 CT ABD & PELVIS W/CONTRAST: CPT

## 2024-12-04 PROCEDURE — 87086 URINE CULTURE/COLONY COUNT: CPT | Mod: PORLAB | Performed by: STUDENT IN AN ORGANIZED HEALTH CARE EDUCATION/TRAINING PROGRAM

## 2024-12-04 PROCEDURE — 74177 CT ABD & PELVIS W/CONTRAST: CPT | Performed by: RADIOLOGY

## 2024-12-04 PROCEDURE — 87070 CULTURE OTHR SPECIMN AEROBIC: CPT | Mod: PORLAB | Performed by: STUDENT IN AN ORGANIZED HEALTH CARE EDUCATION/TRAINING PROGRAM

## 2024-12-04 PROCEDURE — 86140 C-REACTIVE PROTEIN: CPT | Performed by: STUDENT IN AN ORGANIZED HEALTH CARE EDUCATION/TRAINING PROGRAM

## 2024-12-04 PROCEDURE — 83735 ASSAY OF MAGNESIUM: CPT | Performed by: STUDENT IN AN ORGANIZED HEALTH CARE EDUCATION/TRAINING PROGRAM

## 2024-12-04 PROCEDURE — 99223 1ST HOSP IP/OBS HIGH 75: CPT

## 2024-12-04 PROCEDURE — 81001 URINALYSIS AUTO W/SCOPE: CPT | Performed by: STUDENT IN AN ORGANIZED HEALTH CARE EDUCATION/TRAINING PROGRAM

## 2024-12-04 PROCEDURE — 87205 SMEAR GRAM STAIN: CPT | Mod: PORLAB | Performed by: STUDENT IN AN ORGANIZED HEALTH CARE EDUCATION/TRAINING PROGRAM

## 2024-12-04 PROCEDURE — 2500000004 HC RX 250 GENERAL PHARMACY W/ HCPCS (ALT 636 FOR OP/ED): Performed by: STUDENT IN AN ORGANIZED HEALTH CARE EDUCATION/TRAINING PROGRAM

## 2024-12-04 PROCEDURE — 96367 TX/PROPH/DG ADDL SEQ IV INF: CPT

## 2024-12-04 RX ORDER — ONDANSETRON HYDROCHLORIDE 2 MG/ML
4 INJECTION, SOLUTION INTRAVENOUS EVERY 6 HOURS PRN
Status: DISCONTINUED | OUTPATIENT
Start: 2024-12-04 | End: 2024-12-06 | Stop reason: HOSPADM

## 2024-12-04 RX ORDER — ACETAMINOPHEN 325 MG/1
650 TABLET ORAL EVERY 4 HOURS PRN
Status: DISCONTINUED | OUTPATIENT
Start: 2024-12-04 | End: 2024-12-06 | Stop reason: HOSPADM

## 2024-12-04 RX ORDER — VANCOMYCIN HYDROCHLORIDE 1 G/20ML
INJECTION, POWDER, LYOPHILIZED, FOR SOLUTION INTRAVENOUS DAILY PRN
Status: DISCONTINUED | OUTPATIENT
Start: 2024-12-04 | End: 2024-12-06

## 2024-12-04 RX ORDER — TALC
3 POWDER (GRAM) TOPICAL NIGHTLY PRN
Status: DISCONTINUED | OUTPATIENT
Start: 2024-12-04 | End: 2024-12-06 | Stop reason: HOSPADM

## 2024-12-04 RX ORDER — TRAMADOL HYDROCHLORIDE 50 MG/1
50 TABLET ORAL EVERY 6 HOURS PRN
Status: DISCONTINUED | OUTPATIENT
Start: 2024-12-04 | End: 2024-12-06 | Stop reason: HOSPADM

## 2024-12-04 RX ORDER — POLYETHYLENE GLYCOL 3350 17 G/17G
17 POWDER, FOR SOLUTION ORAL DAILY
Status: DISCONTINUED | OUTPATIENT
Start: 2024-12-04 | End: 2024-12-06 | Stop reason: HOSPADM

## 2024-12-04 SDOH — SOCIAL STABILITY: SOCIAL INSECURITY: HAS ANYONE EVER THREATENED TO HURT YOUR FAMILY OR YOUR PETS?: NO

## 2024-12-04 SDOH — ECONOMIC STABILITY: FOOD INSECURITY: WITHIN THE PAST 12 MONTHS, YOU WORRIED THAT YOUR FOOD WOULD RUN OUT BEFORE YOU GOT THE MONEY TO BUY MORE.: NEVER TRUE

## 2024-12-04 SDOH — SOCIAL STABILITY: SOCIAL INSECURITY: DOES ANYONE TRY TO KEEP YOU FROM HAVING/CONTACTING OTHER FRIENDS OR DOING THINGS OUTSIDE YOUR HOME?: NO

## 2024-12-04 SDOH — SOCIAL STABILITY: SOCIAL INSECURITY: WITHIN THE LAST YEAR, HAVE YOU BEEN HUMILIATED OR EMOTIONALLY ABUSED IN OTHER WAYS BY YOUR PARTNER OR EX-PARTNER?: NO

## 2024-12-04 SDOH — ECONOMIC STABILITY: HOUSING INSECURITY: IN THE LAST 12 MONTHS, WAS THERE A TIME WHEN YOU WERE NOT ABLE TO PAY THE MORTGAGE OR RENT ON TIME?: NO

## 2024-12-04 SDOH — ECONOMIC STABILITY: HOUSING INSECURITY: AT ANY TIME IN THE PAST 12 MONTHS, WERE YOU HOMELESS OR LIVING IN A SHELTER (INCLUDING NOW)?: NO

## 2024-12-04 SDOH — SOCIAL STABILITY: SOCIAL INSECURITY
WITHIN THE LAST YEAR, HAVE YOU BEEN RAPED OR FORCED TO HAVE ANY KIND OF SEXUAL ACTIVITY BY YOUR PARTNER OR EX-PARTNER?: NO

## 2024-12-04 SDOH — ECONOMIC STABILITY: INCOME INSECURITY: IN THE PAST 12 MONTHS HAS THE ELECTRIC, GAS, OIL, OR WATER COMPANY THREATENED TO SHUT OFF SERVICES IN YOUR HOME?: NO

## 2024-12-04 SDOH — ECONOMIC STABILITY: FOOD INSECURITY: WITHIN THE PAST 12 MONTHS, THE FOOD YOU BOUGHT JUST DIDN'T LAST AND YOU DIDN'T HAVE MONEY TO GET MORE.: NEVER TRUE

## 2024-12-04 SDOH — SOCIAL STABILITY: SOCIAL INSECURITY: ARE YOU OR HAVE YOU BEEN THREATENED OR ABUSED PHYSICALLY, EMOTIONALLY, OR SEXUALLY BY ANYONE?: NO

## 2024-12-04 SDOH — SOCIAL STABILITY: SOCIAL INSECURITY: HAVE YOU HAD THOUGHTS OF HARMING ANYONE ELSE?: NO

## 2024-12-04 SDOH — SOCIAL STABILITY: SOCIAL INSECURITY: DO YOU FEEL ANYONE HAS EXPLOITED OR TAKEN ADVANTAGE OF YOU FINANCIALLY OR OF YOUR PERSONAL PROPERTY?: NO

## 2024-12-04 SDOH — SOCIAL STABILITY: SOCIAL INSECURITY: ABUSE: ADULT

## 2024-12-04 SDOH — SOCIAL STABILITY: SOCIAL INSECURITY: WITHIN THE LAST YEAR, HAVE YOU BEEN AFRAID OF YOUR PARTNER OR EX-PARTNER?: NO

## 2024-12-04 SDOH — SOCIAL STABILITY: SOCIAL INSECURITY: DO YOU FEEL UNSAFE GOING BACK TO THE PLACE WHERE YOU ARE LIVING?: NO

## 2024-12-04 SDOH — SOCIAL STABILITY: SOCIAL INSECURITY: HAVE YOU HAD ANY THOUGHTS OF HARMING ANYONE ELSE?: NO

## 2024-12-04 SDOH — SOCIAL STABILITY: SOCIAL INSECURITY: WERE YOU ABLE TO COMPLETE ALL THE BEHAVIORAL HEALTH SCREENINGS?: YES

## 2024-12-04 SDOH — SOCIAL STABILITY: SOCIAL INSECURITY
WITHIN THE LAST YEAR, HAVE YOU BEEN KICKED, HIT, SLAPPED, OR OTHERWISE PHYSICALLY HURT BY YOUR PARTNER OR EX-PARTNER?: NO

## 2024-12-04 SDOH — ECONOMIC STABILITY: HOUSING INSECURITY: IN THE PAST 12 MONTHS, HOW MANY TIMES HAVE YOU MOVED WHERE YOU WERE LIVING?: 0

## 2024-12-04 SDOH — ECONOMIC STABILITY: TRANSPORTATION INSECURITY: IN THE PAST 12 MONTHS, HAS LACK OF TRANSPORTATION KEPT YOU FROM MEDICAL APPOINTMENTS OR FROM GETTING MEDICATIONS?: NO

## 2024-12-04 SDOH — ECONOMIC STABILITY: FOOD INSECURITY: HOW HARD IS IT FOR YOU TO PAY FOR THE VERY BASICS LIKE FOOD, HOUSING, MEDICAL CARE, AND HEATING?: NOT HARD AT ALL

## 2024-12-04 SDOH — SOCIAL STABILITY: SOCIAL INSECURITY: ARE THERE ANY APPARENT SIGNS OF INJURIES/BEHAVIORS THAT COULD BE RELATED TO ABUSE/NEGLECT?: NO

## 2024-12-04 ASSESSMENT — ACTIVITIES OF DAILY LIVING (ADL)
HEARING - RIGHT EAR: FUNCTIONAL
BATHING: NEEDS ASSISTANCE
WALKS IN HOME: INDEPENDENT
FEEDING YOURSELF: INDEPENDENT
PATIENT'S MEMORY ADEQUATE TO SAFELY COMPLETE DAILY ACTIVITIES?: YES
ADEQUATE_TO_COMPLETE_ADL: YES
GROOMING: NEEDS ASSISTANCE
HEARING - LEFT EAR: FUNCTIONAL
JUDGMENT_ADEQUATE_SAFELY_COMPLETE_DAILY_ACTIVITIES: YES
LACK_OF_TRANSPORTATION: NO
DRESSING YOURSELF: NEEDS ASSISTANCE
LACK_OF_TRANSPORTATION: NO
TOILETING: NEEDS ASSISTANCE
ASSISTIVE_DEVICE: WHEELCHAIR

## 2024-12-04 ASSESSMENT — COGNITIVE AND FUNCTIONAL STATUS - GENERAL
TOILETING: A LITTLE
DRESSING REGULAR LOWER BODY CLOTHING: A LITTLE
DRESSING REGULAR LOWER BODY CLOTHING: A LITTLE
DAILY ACTIVITIY SCORE: 21
CLIMB 3 TO 5 STEPS WITH RAILING: TOTAL
HELP NEEDED FOR BATHING: A LITTLE
PATIENT BASELINE BEDBOUND: NO
STANDING UP FROM CHAIR USING ARMS: A LOT
DAILY ACTIVITIY SCORE: 21
WALKING IN HOSPITAL ROOM: TOTAL
MOBILITY SCORE: 16
TOILETING: A LITTLE
CLIMB 3 TO 5 STEPS WITH RAILING: TOTAL
WALKING IN HOSPITAL ROOM: TOTAL
MOBILITY SCORE: 16
STANDING UP FROM CHAIR USING ARMS: A LOT
HELP NEEDED FOR BATHING: A LITTLE

## 2024-12-04 ASSESSMENT — LIFESTYLE VARIABLES
AUDIT-C TOTAL SCORE: 0
HOW OFTEN DO YOU HAVE A DRINK CONTAINING ALCOHOL: NEVER
AUDIT-C TOTAL SCORE: 0
SKIP TO QUESTIONS 9-10: 1
EVER HAD A DRINK FIRST THING IN THE MORNING TO STEADY YOUR NERVES TO GET RID OF A HANGOVER: NO
HAVE PEOPLE ANNOYED YOU BY CRITICIZING YOUR DRINKING: NO
HOW OFTEN DO YOU HAVE 6 OR MORE DRINKS ON ONE OCCASION: NEVER
TOTAL SCORE: 0
HOW MANY STANDARD DRINKS CONTAINING ALCOHOL DO YOU HAVE ON A TYPICAL DAY: PATIENT DOES NOT DRINK
HAVE YOU EVER FELT YOU SHOULD CUT DOWN ON YOUR DRINKING: NO
EVER FELT BAD OR GUILTY ABOUT YOUR DRINKING: NO

## 2024-12-04 ASSESSMENT — ENCOUNTER SYMPTOMS
VOMITING: 0
FLANK PAIN: 0
COUGH: 0
CHILLS: 0
TREMORS: 0
NAUSEA: 0
CONSTIPATION: 0
SHORTNESS OF BREATH: 0
JOINT SWELLING: 0
BACK PAIN: 0
WOUND: 1
HEMATURIA: 0
CHEST TIGHTNESS: 0
ABDOMINAL PAIN: 0
PALPITATIONS: 0
FEVER: 0
WHEEZING: 0
DIARRHEA: 0
FATIGUE: 0
WEAKNESS: 0
HEADACHES: 0

## 2024-12-04 ASSESSMENT — COLUMBIA-SUICIDE SEVERITY RATING SCALE - C-SSRS
6. HAVE YOU EVER DONE ANYTHING, STARTED TO DO ANYTHING, OR PREPARED TO DO ANYTHING TO END YOUR LIFE?: NO
1. IN THE PAST MONTH, HAVE YOU WISHED YOU WERE DEAD OR WISHED YOU COULD GO TO SLEEP AND NOT WAKE UP?: NO
2. HAVE YOU ACTUALLY HAD ANY THOUGHTS OF KILLING YOURSELF?: NO

## 2024-12-04 ASSESSMENT — PATIENT HEALTH QUESTIONNAIRE - PHQ9
2. FEELING DOWN, DEPRESSED OR HOPELESS: NOT AT ALL
1. LITTLE INTEREST OR PLEASURE IN DOING THINGS: NOT AT ALL
SUM OF ALL RESPONSES TO PHQ9 QUESTIONS 1 & 2: 0

## 2024-12-04 ASSESSMENT — PAIN SCALES - GENERAL: PAINLEVEL_OUTOF10: 0 - NO PAIN

## 2024-12-04 ASSESSMENT — PAIN - FUNCTIONAL ASSESSMENT: PAIN_FUNCTIONAL_ASSESSMENT: 0-10

## 2024-12-04 ASSESSMENT — PAIN DESCRIPTION - PAIN TYPE: TYPE: ACUTE PAIN

## 2024-12-04 NOTE — CARE PLAN
The patient's goals for the shift include decrease sacral infection     The clinical goals for the shift include  decrease sacral infection    Over the shift, the patient did not make progress toward the following goals. Barriers to progression include osteomyelitis. Recommendations to address these barriers include antibiotics.

## 2024-12-04 NOTE — H&P
[facility] - GENERAL MEDICINE HISTORY AND PHYSICAL    History Obtained From (Primary Source): patient  Collateral History (Secondary Sources): D/w ED provider, chart review    History Of Present Illness (HPI):  Alexis Ruiz is a 29 y.o. male with PMHx s/f spina bifida, neurogenic bladder with chronic anaya, hypothyroid, ADHD, GERD, asthma, bipolar presenting with left gluteal wound. He states he is brought in by police because he has to get a medical clearance before going to [facility]. He follows with wound care clinic and with discussing with ED provider, he seems to be having issues with his compliance and it took them several months for him to get an MRI. MRI taken last month revealed he has an osteomyelitis and he was supposed to start on 6 weeks of IV antibiotics starting today. He is unsure when the wound started but there is a documentation of bedside I&D starting in September of 2022 at [facility]. Patient is paraplegic, is wheelchair bound and right leg amputee and reports he hasn't noticed increased odor, drainage, or redness. He notes of mild discomfort with sitting. Denies f/c/n/v, chest pain, sob, abd pain, paresthesias. He denies taking any antibiotics, not on any prescribed mediations.     ED Course (Summary - please note all labs, imaging studies, and interventions noted below have been personally reviewed and/or interpreted on day of admission):   Vitals on presentation: 98.6F, 102 bpm, 16rr, 127/71, 100% on RA  Labs: CMP unremarkable  CBC - WBC 11.4, platelets 541, neutrophils 8.44  ESR 83, CRP 18.15  UA - leukocyte esterase 500, bacteria 1+, WBC >50  EKG: none  Imaging: Deep open wound involving the left buttock which openly drains out the posterior aspect of the left buttock. This extends to the left inferior pubic ramus. There is sclerosis and periosteal reaction involving the left inferior pubic ramus consistent with osteomyelitis. a 2nd abscess or walled-off  collection containing dots of air involving the subcutaneous fat along the medial aspect of the buttock at the crease.  Interventions: zosyn, vanc, admission for further management    12-point ROS reviewed and found to be negative aside from aforementioned positives in HPI and/or noted in dedicated ROS section below.     ED Course (From ED Provider):  ED Course as of 12/04/24 1710   Wed Dec 04, 2024   1431 I spoke with the wound center who states they have been having issues with his compliance and that it took multiple months to get an MRI.  They have tried multiple different p.o. medications in the past and he is now being sent to infectious disease because he will require IV antibiotics.  They state that he is currently not on antibiotics. [CF]   1640 IMPRESSION:  1. Deep open wound involving the left buttock which openly drains out  the posterior aspect of the left buttock. This extends to the left  inferior pubic ramus. There is sclerosis and periosteal reaction  involving the left inferior pubic ramus consistent with  osteomyelitis. The appearance of the left inferior pubic ramus is  unchanged compared to 09/27/2024. There is debris or packing material  within this open wound with no fluid. The size of the wound is larger  compared to 09/27/2024.  2. There is a 2nd abscess or walled-off collection containing dots of  air involving the subcutaneous fat along the medial aspect of the  buttock at the crease. This is larger compared to 09/24/2027.  3. Cellulitis subcutaneous fat right buttock.  4. Gutierrez catheter insufflated in a nondistended urinary bladder. The  bladder wall is diffusely thickened.  5. Marked constipation.   [CF]      ED Course User Index  [CF] Vicki Conway MD         Diagnoses as of 12/04/24 1710   Osteomyelitis, unspecified site, unspecified type (Multi)     Relevant Results  Results for orders placed or performed during the hospital encounter of 12/04/24 (from the past 24 hours)   CBC  and Auto Differential   Result Value Ref Range    WBC 11.4 (H) 4.4 - 11.3 x10*3/uL    nRBC 0.0 0.0 - 0.0 /100 WBCs    RBC 5.35 4.50 - 5.90 x10*6/uL    Hemoglobin 12.4 (L) 13.5 - 17.5 g/dL    Hematocrit 38.9 (L) 41.0 - 52.0 %    MCV 73 (L) 80 - 100 fL    MCH 23.2 (L) 26.0 - 34.0 pg    MCHC 31.9 (L) 32.0 - 36.0 g/dL    RDW 14.3 11.5 - 14.5 %    Platelets 541 (H) 150 - 450 x10*3/uL    Neutrophils % 74.1 40.0 - 80.0 %    Immature Granulocytes %, Automated 0.4 0.0 - 0.9 %    Lymphocytes % 13.4 13.0 - 44.0 %    Monocytes % 10.0 2.0 - 10.0 %    Eosinophils % 1.5 0.0 - 6.0 %    Basophils % 0.6 0.0 - 2.0 %    Neutrophils Absolute 8.44 (H) 1.20 - 7.70 x10*3/uL    Immature Granulocytes Absolute, Automated 0.04 0.00 - 0.70 x10*3/uL    Lymphocytes Absolute 1.53 1.20 - 4.80 x10*3/uL    Monocytes Absolute 1.14 (H) 0.10 - 1.00 x10*3/uL    Eosinophils Absolute 0.17 0.00 - 0.70 x10*3/uL    Basophils Absolute 0.07 0.00 - 0.10 x10*3/uL   Magnesium   Result Value Ref Range    Magnesium 2.16 1.60 - 2.40 mg/dL   Comprehensive metabolic panel   Result Value Ref Range    Glucose 97 74 - 99 mg/dL    Sodium 137 136 - 145 mmol/L    Potassium 3.7 3.5 - 5.3 mmol/L    Chloride 102 98 - 107 mmol/L    Bicarbonate 27 21 - 32 mmol/L    Anion Gap 12 10 - 20 mmol/L    Urea Nitrogen 7 6 - 23 mg/dL    Creatinine 0.65 0.50 - 1.30 mg/dL    eGFR >90 >60 mL/min/1.73m*2    Calcium 9.2 8.6 - 10.3 mg/dL    Albumin 3.7 3.4 - 5.0 g/dL    Alkaline Phosphatase 65 33 - 120 U/L    Total Protein 7.4 6.4 - 8.2 g/dL    AST 8 (L) 9 - 39 U/L    Bilirubin, Total 0.3 0.0 - 1.2 mg/dL    ALT 6 (L) 10 - 52 U/L   Lactate   Result Value Ref Range    Lactate 0.7 0.4 - 2.0 mmol/L   C-Reactive Protein   Result Value Ref Range    C-Reactive Protein 18.15 (H) <1.00 mg/dL   Sedimentation Rate   Result Value Ref Range    Sedimentation Rate 83 (H) 0 - 15 mm/h   Urinalysis with Reflex Culture and Microscopic   Result Value Ref Range    Color, Urine Light-Yellow Light-Yellow, Yellow,  Dark-Yellow    Appearance, Urine Turbid (N) Clear    Specific Gravity, Urine 1.009 1.005 - 1.035    pH, Urine 8.0 5.0, 5.5, 6.0, 6.5, 7.0, 7.5, 8.0    Protein, Urine 20 (TRACE) NEGATIVE, 10 (TRACE), 20 (TRACE) mg/dL    Glucose, Urine Normal Normal mg/dL    Blood, Urine NEGATIVE NEGATIVE    Ketones, Urine NEGATIVE NEGATIVE mg/dL    Bilirubin, Urine NEGATIVE NEGATIVE    Urobilinogen, Urine Normal Normal mg/dL    Nitrite, Urine NEGATIVE NEGATIVE    Leukocyte Esterase, Urine 500 Curtis/µL (A) NEGATIVE   Microscopic Only, Urine   Result Value Ref Range    WBC, Urine >50 (A) 1-5, NONE /HPF    RBC, Urine 1-2 NONE, 1-2, 3-5 /HPF    Squamous Epithelial Cells, Urine 1-9 (SPARSE) Reference range not established. /HPF    Bacteria, Urine 1+ (A) NONE SEEN /HPF    Mucus, Urine FEW Reference range not established. /LPF    Triple Phosphate Crystals, Urine 1+ NONE, 1+ /HPF      CT abdomen pelvis w IV contrast    Result Date: 12/4/2024  Interpreted By:  Nancy Shen, STUDY: CT ABDOMEN PELVIS W IV CONTRAST;  12/4/2024 4:14 pm   INDICATION: Signs/Symptoms:wound infection on left buttocks, ? worsening osteo.   COMPARISON: CT pelvis 09/27/2024   ACCESSION NUMBER(S): [id number]   ORDERING CLINICIAN: JORGE ALBERTO LORENZO   TECHNIQUE: CT of the abdomen and pelvis was performed.  75 mL Omnipaque 350   FINDINGS: LOWER CHEST: Images of the lung bases show no infiltrate or pleural fluid.   ABDOMEN:   LIVER: There is no hepatic mass.   BILE DUCTS: There is no intrahepatic, common hepatic or common bile ductal dilatation.   GALLBLADDER: The gallbladder is unremarkable.   PANCREAS: The pancreas is unremarkable.   SPLEEN: The spleen is unremarkable. There is no splenic mass or splenomegaly.   ADRENAL GLANDS: The adrenal glands are unremarkable.   KIDNEYS AND URETERS: The kidneys function symmetrically. The kidneys demonstrate no mass. There is no intrarenal calculus or hydronephrosis. There is a Gutierrez catheter insufflated in a nondistended urinary  bladder. The bladder is diffusely thickened.   BOWEL: There is no bowel wall thickening, dilatation or obstruction. There is a large amount of stool throughout the colon.   VESSELS: The abdominal and pelvic vessels are unremarkable.   PERITONEUM/RETROPERITONEUM/LYMPH NODES: There is no retroperitoneal or pelvic adenopathy.  There is no ascites.   ABDOMINAL WALL: The abdominal wall is unremarkable.   BONE AND SOFT TISSUE: There is no acute osseous finding. There are bilateral chronic hip dislocations.   There is a deep open wound involving the left buttock. This opens up into the skin of the posterior surface of the left buttock. The wall is thick. The skin is thickened. This extends to the left inferior pubic ramus. There is sclerosis of the left inferior pubic ramus and periosteal reaction consistent with osteomyelitis. The appearance of the left inferior pubic ramus is unchanged compared to 09/27/2024. There is debris or packing material in this wound but no fluid. This measures 6.8 x 4.0 x 3.9 cm. On 09/27/2024, this measured 2.3 x 1.0 cm. There is a 2nd abscess containing dots of air and a thin enhancing wall involving the medial aspect of the crease of the left buttock. There is no fluid. This measures 3.7 x 2.8 x 2.1 cm. This may be connected to the larger abscess. On 09/27/2024, this measured 2.1 x 1.0 cm. There is induration of the fat of the right buttock consistent with cellulitis with no abscess. There is induration of the fat extends to the right inferior pubic ramus. There is no evidence for osteomyelitis of the right inferior pubic ramus.       1. Deep open wound involving the left buttock which openly drains out the posterior aspect of the left buttock. This extends to the left inferior pubic ramus. There is sclerosis and periosteal reaction involving the left inferior pubic ramus consistent with osteomyelitis. The appearance of the left inferior pubic ramus is unchanged compared to 09/27/2024. There  is debris or packing material within this open wound with no fluid. The size of the wound is larger compared to 09/27/2024. 2. There is a 2nd abscess or walled-off collection containing dots of air involving the subcutaneous fat along the medial aspect of the buttock at the crease. This is larger compared to 09/24/2027. 3. Cellulitis subcutaneous fat right buttock. 4. Gutierrez catheter insufflated in a nondistended urinary bladder. The bladder wall is diffusely thickened. 5. Marked constipation.   MACRO: None   Signed by: Nancy Shen 12/4/2024 4:31 PM Dictation workstation:   [id number]    Scheduled medications:  piperacillin-tazobactam, 4.5 g, intravenous, q6h  vancomycin, 2 g, intravenous, Once  [START ON 12/5/2024] vancomycin 1,500 mg in dextrose 5% 500 mL IV, 1,500 mg, intravenous, q12h      Continuous medications:     PRN medications:  PRN medications: acetaminophen, melatonin, ondansetron, traMADol, vancomycin     Past Medical History  He has a past medical history of Allergic, Anxiety, Bipolar affective disorder (Multi), Neurogenic bladder, and Spina bifida (Multi).    Surgical History  He has a past surgical history that includes Other surgical history (12/26/2019); Other surgical history (12/26/2019); Other surgical history (12/26/2019); Other surgical history (12/26/2019); and Leg amputation, lower tibia/fibula (Right, 04/2022).     Social History  He reports that he has been smoking cigarettes. He has never used smokeless tobacco. He reports that he does not currently use alcohol. He reports that he does not use drugs.    Family History  Family History   Adopted: Yes       Allergies  Ciprofloxacin and Latex    Code Status  Full Code     Review of Systems   Constitutional:  Negative for chills, fatigue and fever.   Respiratory:  Negative for cough, chest tightness, shortness of breath and wheezing.    Cardiovascular:  Negative for chest pain, palpitations and leg swelling.   Gastrointestinal:  Negative  for abdominal pain, constipation, diarrhea, nausea and vomiting.   Genitourinary:  Negative for flank pain and hematuria.        Mild discomfort with sitting on wound   Musculoskeletal:  Negative for back pain and joint swelling.   Skin:  Positive for wound. Negative for rash.   Neurological:  Negative for tremors, syncope, weakness and headaches.       Last Recorded Vitals  /71   Pulse 75   Temp 37.1 °C (98.7 °F) (Tympanic)   Resp 16   Wt 90.7 kg (200 lb)   SpO2 100%      Physical Exam:  Vital signs and nursing notes reviewed.   Constitutional: Pleasant and cooperative. Laying in bed in no acute distress. Conversant.   Skin: Warm and dry; a golf ball sized wound in left inferior and distal to gluteal cleft, no obvious redness and drainage but malodorous present. See ED provider note/media section for detailed pic.    Eyes: EOMI. Anicteric sclera.   ENT: Mucous membranes moist; no obvious injury or deformity appreciated.   Head and Neck: Normocephalic, atraumatic. ROM preserved. Trachea midline. No appreciable JVD.   Respiratory: Nonlabored on RA. Lungs clear to auscultation bilaterally without obvious adventitious sounds. Chest rise is equal.  Cardiovascular: RRR. No gross murmur, gallop, or rub. Extremities are warm and well-perfused with good capillary refill (< 3 seconds). No chest wall tenderness.   GI: Abdomen soft, nontender, nondistended. No obvious organomegaly appreciated. Bowel sounds are present.  : anaya present, yellow clear liquid noted in bag. No CVA tenderness.   MSK: Contracted, atrophy on bilateral lower extremities. S/p right leg amputation.   Extremities: No cyanosis, edema, or clubbing evident. Neurovascularly intact.   Neuro: A&Ox3. CN 2-12 grossly intact. Able to respond to questions appropriately and clearly. No acute focal neurologic deficits appreciated.  Psych: irritated    Assessment/Plan     29 y.o. male with PMHx s/f spina bifida, neurogenic bladder with chronic anaya,  hypothyroid, ADHD, GERD, asthma, bipolar presenting with left gluteal wound.     Osteomyelitis of stage IV left ischial pressure sore, POA  2nd Abscess in medial aspect of buttock  -continue on zosyn, vancomycin  -wound care consult  -blood cultures x2, wound culture collected in ED  -ID consult    Neurogenic bladder with chronic anaya  UTI with chronic indwelling anaya, POA  -anaya exchange ordered  -continue abx management as above  -urine culture pending  -adjust abx pending cx result  -does not meet sepsis criteria at admission    Marked constipation on CT scan  -Miralax daily    Diet: regular  DVT Prophylaxis: low risk score  Code Status: DNR  Case Discussed With: ED provider  Additional Sources Reviewed: prior hospitalization note, wound care clinic notes    Anticipated Length of Stay (LOS): 2+ midnights for OM and UTI management     Nataly Retana PA-C    Dragon dictation software was used to dictate this note and thus there may be minor errors in translation/transcription including garbled speech or misspellings. Please contact for clarification if needed.

## 2024-12-04 NOTE — NURSING NOTE
Pt arrived to room now via cot. Pt able to slide self into bed, tolerated well. Pt denies further needs. Pt short with RN at bedside. Bed locked, in low position, bed alarm on, call light within reach.

## 2024-12-04 NOTE — ED PROVIDER NOTES
Alexis Ruiz is a 29 y.o. patient presenting to the ED for evaluation of osteomyelitis of sacral region and medical clearance. Patient is wheelchair bound and right leg amputee and had a L sacral wound for over a year. He has decreased LE sensation at baseline. Patient has been getting wound care and had imaging done recently that showed osteomyelitis. He had an appointment to get set up for 6 weeks of IV abx, but got arrested today and was told that he needs medical clearance before going to [facility]. Denies new redness, numbness, fevers, chills.    Additional History Obtained from: Patient   Limitations to History: None   ------------------------------------------------------------------------------------------------------------------------------------------  Physical Exam:  Appearance: Alert, cooperative,   Skin: Warm, dry, appropriate color for ethnicity.  Eyes: Cornea clear. No scleral icterus or injection.   ENT: Mucous membranes moist.  Pulmonary: No accessory muscle use or stridor. Clear lung sounds bilaterally without rhonchi or wheezing.   Cardiac: Tachycardiac. Heart sounds regular without murmur.   Abdomen: Soft, not tender. No rebound or guarding.   Musculoskeletal: Right leg amputee.  Neurological: Face symmetrical. Voice clear. Appropriately conversant. Sensation at baseline to LLE.  Psychiatric: Appropriate mood and affect.  Sacral: See image below        MR MSK pelvis wo IV contrast 11/25/2024    Narrative  Interpreted By:  Naty Fritz and Lawrence Austen  STUDY:  MRI of the pelvis without IV contrast;  11/25/2024 3:15 pm    INDICATION:  Signs/Symptoms:confirm osteomyelitis.    ,L89.324 Pressure ulcer of left buttock, stage 4 (Multi)    COMPARISON:  CT 09/27/2024.    ACCESSION NUMBER(S):  [id number]    ORDERING CLINICIAN:  VALERIE FARRELL    TECHNIQUE:  MR imaging of the musculoskeletal pelvis was obtained  without  administration of intravenous contrast  medium.    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 Femoral  heads are dysplastic in appearance. SI joints and visualized spine  are intact.    OSSEOUS STRUCTURES:  Osseous irregularity of the left ischial tuberosity with extensive T1  hypointensity and some T2 hyperintensity through the left inferior  pubic ramus and ischial tuberosity. There is adjacent ossification in  the soft tissues inferior to the left ischial tuberosity.    SOFT TISSUES:  Large soft tissue ulcer with deep tracts containing fluid and  communicating with the skin laterally and with the skin of the  gluteal cleft. This tract extends to the left ischial tuberosity and  there is adjacent cellulitis. No drainable fluid collection. The  sciatic nerves are intact and unremarkable. The left hamstrings  tendons are involved of the above-mentioned infection. Otherwise, the  visualized muscular and tendinous structures are intact.    INTERNAL ORGANS:  Evaluation of the internal organs of the pelvis is limited on this  study tailored for evaluation of the musculoskeletal system. No gross  abnormality is seen in the pelvic organs. Gutierrez catheter in the  urinary bladder.    Impression  1. Large soft tissue ulcer over the midline and left gluteal region  with adjacent cellulitis and tract communicating to the left ischial  tuberosity. There is signal abnormality in the left ischial  tuberosity and inferior pubic ramus consistent with osteomyelitis.  There is fluid throughout the tract but no discrete fluid collection.  2. 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      I personally reviewed the images/study and I agree with  the findings  as stated. This study was interpreted at [facility], [place], [place].    MACRO:  None    Signed by: Naty Fritz 2024 9:57 AM  Dictation workstation:   [id number]      Medical Decision Makin: Patient presenting with known osteomyelitis on MRI here for medical clearance before going to senior living. Obtain repeat wound cx, imaging, and start on IV abx. Check inflammatory markers, CBC, CMP, UA.    Chronic Medical Conditions Significantly Affecting Care:  has a past medical history of Allergic, Anxiety, Bipolar affective disorder (Multi), Neurogenic bladder, and Spina bifida (Multi).    Social Determinants of Health Significantly Affecting Care:       Patient seen and examined with attending physician, Dr. Conway.     Ivana Morillo  [id number]

## 2024-12-04 NOTE — ED TRIAGE NOTES
Pt presents for medical clearance for [facility] and is being checked for a wound infection. He currently has a sacral wound. He states that it has been there for awhile and that it is in the bone. He is currently seeing wound care for it. He denies any drainage. Odor is present. Pt is wheelchair bound and is a amputee. He states he is supposed to start antibiotics for the infection today. He is in police custody.

## 2024-12-04 NOTE — PROGRESS NOTES
Vancomycin Dosing by Pharmacy- INITIAL    Alexis Ruiz is a 29 y.o. year old male who Pharmacy has been consulted for vancomycin dosing for cellulitis, skin and soft tissue. Based on the patient's indication and renal status this patient will be dosed based on a goal AUC of 500-600.     Renal function is currently stable.    Visit Vitals  /71   Pulse 75   Temp 37.1 °C (98.7 °F) (Tympanic)   Resp 16        Lab Results   Component Value Date    CREATININE 0.65 2024    CREATININE 0.72 2022    CREATININE 0.77 2022    CREATININE 0.79 2022    CREATININE 0.70 2022        Patient weight is as follows:   Vitals:    24 1331   Weight: 90.7 kg (200 lb)       Cultures:  No results found for the encounter in last 14 days.        No intake/output data recorded.  I/O during current shift:  I/O this shift:  In: 100 [IV Piggyback:100]  Out: -     Temp (24hrs), Av.1 °C (98.7 °F), Min:37 °C (98.6 °F), Max:37.1 °C (98.7 °F)         Assessment/Plan     Patient has already been given a loading dose of 2000 mg.  Will initiate vancomycin maintenance, a loading dose of 2000 mg followed by a dose of 1500 mg ~12 hours later.    This dosing regimen is predicted by [employer] to result in the following pharmacokinetic parameters:  Loading dose: N/A  Regimen: 1500 mg IV every 12 hours.  Start time: 03:19 on 2024  Exposure target: AUC24 (range)400-600 mg/L.hr   [id number]: 517 mg/L.hr  AUC24,ss: 554 mg/L.hr  Probability of AUC24 > 400: 80 %  Ctrough,ss: 16.5 mg/L  Probability of Ctrough,ss > 20: 36 %      Follow-up level will be ordered on  at 0500 unless clinically indicated sooner.  Will continue to monitor renal function daily while on vancomycin and order serum creatinine at least every 48 hours if not already ordered.  Follow for continued vancomycin needs, clinical response, and signs/symptoms of toxicity.       Joseph Bravo, PharmD

## 2024-12-05 ENCOUNTER — HOME CARE VISIT (OUTPATIENT)
Dept: HOME HEALTH SERVICES | Facility: HOME HEALTH | Age: 29
End: 2024-12-05
Payer: COMMERCIAL

## 2024-12-05 LAB
ALBUMIN SERPL BCP-MCNC: 3.2 G/DL (ref 3.4–5)
ALP SERPL-CCNC: 56 U/L (ref 33–120)
ALT SERPL W P-5'-P-CCNC: 5 U/L (ref 10–52)
ANION GAP SERPL CALC-SCNC: 10 MMOL/L (ref 10–20)
AST SERPL W P-5'-P-CCNC: 6 U/L (ref 9–39)
BILIRUB SERPL-MCNC: 0.2 MG/DL (ref 0–1.2)
BUN SERPL-MCNC: 11 MG/DL (ref 6–23)
CALCIUM SERPL-MCNC: 8.7 MG/DL (ref 8.6–10.3)
CHLORIDE SERPL-SCNC: 103 MMOL/L (ref 98–107)
CO2 SERPL-SCNC: 29 MMOL/L (ref 21–32)
CREAT SERPL-MCNC: 0.7 MG/DL (ref 0.5–1.3)
EGFRCR SERPLBLD CKD-EPI 2021: >90 ML/MIN/1.73M*2
ERYTHROCYTE [DISTWIDTH] IN BLOOD BY AUTOMATED COUNT: 14.4 % (ref 11.5–14.5)
GLUCOSE SERPL-MCNC: 88 MG/DL (ref 74–99)
HCT VFR BLD AUTO: 35.5 % (ref 41–52)
HGB BLD-MCNC: 11.3 G/DL (ref 13.5–17.5)
HOLD SPECIMEN: NORMAL
MCH RBC QN AUTO: 23.3 PG (ref 26–34)
MCHC RBC AUTO-ENTMCNC: 31.8 G/DL (ref 32–36)
MCV RBC AUTO: 73 FL (ref 80–100)
NRBC BLD-RTO: 0 /100 WBCS (ref 0–0)
PLATELET # BLD AUTO: 516 X10*3/UL (ref 150–450)
POTASSIUM SERPL-SCNC: 3.9 MMOL/L (ref 3.5–5.3)
PROT SERPL-MCNC: 6.5 G/DL (ref 6.4–8.2)
RBC # BLD AUTO: 4.85 X10*6/UL (ref 4.5–5.9)
SODIUM SERPL-SCNC: 138 MMOL/L (ref 136–145)
WBC # BLD AUTO: 10.9 X10*3/UL (ref 4.4–11.3)

## 2024-12-05 PROCEDURE — 99233 SBSQ HOSP IP/OBS HIGH 50: CPT | Performed by: INTERNAL MEDICINE

## 2024-12-05 PROCEDURE — 36415 COLL VENOUS BLD VENIPUNCTURE: CPT

## 2024-12-05 PROCEDURE — 84075 ASSAY ALKALINE PHOSPHATASE: CPT

## 2024-12-05 PROCEDURE — 2500000004 HC RX 250 GENERAL PHARMACY W/ HCPCS (ALT 636 FOR OP/ED)

## 2024-12-05 PROCEDURE — 1100000001 HC PRIVATE ROOM DAILY

## 2024-12-05 PROCEDURE — 85027 COMPLETE CBC AUTOMATED: CPT

## 2024-12-05 ASSESSMENT — COGNITIVE AND FUNCTIONAL STATUS - GENERAL
DRESSING REGULAR LOWER BODY CLOTHING: A LOT
STANDING UP FROM CHAIR USING ARMS: TOTAL
TOILETING: A LOT
DAILY ACTIVITIY SCORE: 16
HELP NEEDED FOR BATHING: A LOT
HELP NEEDED FOR BATHING: A LOT
STANDING UP FROM CHAIR USING ARMS: TOTAL
MOBILITY SCORE: 15
PERSONAL GROOMING: A LITTLE
WALKING IN HOSPITAL ROOM: TOTAL
CLIMB 3 TO 5 STEPS WITH RAILING: TOTAL
CLIMB 3 TO 5 STEPS WITH RAILING: TOTAL
DRESSING REGULAR UPPER BODY CLOTHING: A LITTLE
WALKING IN HOSPITAL ROOM: TOTAL
TOILETING: A LOT
PERSONAL GROOMING: A LITTLE
DRESSING REGULAR UPPER BODY CLOTHING: A LITTLE
MOBILITY SCORE: 15
DAILY ACTIVITIY SCORE: 16
DRESSING REGULAR LOWER BODY CLOTHING: A LOT

## 2024-12-05 ASSESSMENT — PAIN SCALES - GENERAL
PAINLEVEL_OUTOF10: 0 - NO PAIN
PAINLEVEL_OUTOF10: 0 - NO PAIN

## 2024-12-05 NOTE — PROGRESS NOTES
Alexis Ruiz is a 29 y.o. male admitted for Osteomyelitis, unspecified site, unspecified type (Multi). Pharmacy reviewed the patient's [id number] medications and allergies for accuracy.    The list below reflects the PTA list prior to pharmacy medication history. A summary a changes to the PTA medication list has been listed below. Please review each medication in order reconciliation for additional clarification and justification.    Source of information: t2p     Medications added:    Medications modified:    Medications to be removed:  Hysept solution  Bactrim 800-160mg     Medications of concern:      Prior to Admission Medications   Prescriptions Last Dose Informant Patient Reported? Taking?   HySept 0.25 % external solution   Yes No   Sig: soak 4x4 and insert at least 3 (THREE) times a week   sulfamethoxazole-trimethoprim (Bactrim DS) 800-160 mg tablet   Yes No   Sig: Take 1 tablet by mouth 2 times a day. Indications: infection of bone      Facility-Administered Medications: None       JOVANNA HAAS

## 2024-12-05 NOTE — PROGRESS NOTES
12/05/24 1415   Discharge Planning   Living Arrangements Alone   Support Systems Family members;Friends/neighbors   Assistance Needed none   Type of Residence Private residence   Home or Post Acute Services None   Expected Discharge Disposition Home   Patient Choice   Patient / Family choosing to utilize agency / facility established prior to hospitalization No   Intensity of Service   Intensity of Service 0-30 min     Pt lives at home alone. Pt is currently under law enforcement custody with  at bedside. Pt is wheelchair bound at base line due to Spina Bifida. Pt confirms PCP as [facility]. Pt anticipates to be discharged into law enforcement custody . ID following with suggestion of Augmentin and doxycycline for 6 weeks pending blood culture final results. TCC following.    - we will stop HCTZ, start patient on amlodipine 5 mg daily as alternative  - Avoid HCTZ/Chlorthalidone in the future  - follow-up with PCP for monitor

## 2024-12-05 NOTE — PROGRESS NOTES
Physical Therapy                 Therapy Communication Note    Patient Name: Alexis Ruiz  MRN: [id number]  Department: [facility] 3 E  Room: [facility]3316-A  Today's Date: 12/5/2024     Discipline: Physical Therapy    Missed Visit Reason: Missed Visit Reason: Patient refused (STATES HE FEELS HE IS MOBILIZING AT BASELINE, HE HAS NOT YET BEEN OOB WHILE IN HOSPITAL, WILL CHECK BACK TOM[facility]ROE AND ENCOURAGE TRANSFER PRACTICE)    Missed Time: Attempt    Comment:

## 2024-12-05 NOTE — CONSULTS
"Nutrition Initial Assessment:   Nutrition Assessment    Reason for Assessment: Admission nursing screening    Medical history per chart:     29 y.o. male with PMHx s/f spina bifida, neurogenic bladder with chronic anaya, hypothyroid, ADHD, GERD, asthma, bipolar presenting with left gluteal wound. He states he is brought in by police because he has to get a medical clearance before going to group home.      12/5:  Pt reports good PO intake, and no supplements prior to admit.  Pt with stage 4 pressure ulcer per notes.  Reviewed protein sources, and patient agreeable to trial of Juan supplement to promote healing.    Nutrition History:  Energy Intake: Good > 75 %     Vitamin/Herbal Supplement Use: No supplements     Current Diet: Adult diet Regular    Average meal Intake during admission: %       Nutrition Related Findings:   Oral Symptoms: none       BM: Last BM Date: 12/02/24 (per pt)  Food allergies: NKFA. is allergic to ciprofloxacin and latex.  Meds/Labs reviewed.  piperacillin-tazobactam, 4.5 g, intravenous, q6h  polyethylene glycol, 17 g, oral, Daily  vancomycin 1,500 mg in dextrose 5% 500 mL IV, 1,500 mg, intravenous, q12h             Nutrition Significant Labs:    Results from last 7 days   Lab Units 12/05/24  0312 12/04/24  1407   GLUCOSE mg/dL 88 97   SODIUM mmol/L 138 137   POTASSIUM mmol/L 3.9 3.7   CHLORIDE mmol/L 103 102   CO2 mmol/L 29 27   BUN mg/dL 11 7   CREATININE mg/dL 0.70 0.65   EGFR mL/min/1.73m*2 >90 >90   CALCIUM mg/dL 8.7 9.2   MAGNESIUM mg/dL  --  2.16     No results found for: \"HGBA1C\"        Anthropometrics:  Height: 182.9 cm (6')   Weight: 90.7 kg (200 lb)   BMI (Calculated): 27.12  IBW/kg (Dietitian Calculated): 80.9 kg     Adjusted Body Weight (kg): 77 kg (Adj. for Paraplegia)    Weight History:   Wt Readings from Last 10 Encounters:   12/04/24 90.7 kg (200 lb)   11/25/24 81.6 kg (180 lb)   05/16/24 45.4 kg (100 lb)   05/20/22 99.8 kg (220 lb)   07/31/20 99.8 kg (220 lb)        Weight " Change %:  Weight History / % Weight Change: Patient reports stable weight.  Per records noted a questionable 10% weight gain x 1 month  Significant Weight Loss: No          Nutrition Focused Physical Exam Findings:    Subcutaneous Fat Loss:   Orbital Fat Pads: Well nourished (slightly bulging fat pads)  Buccal Fat Pads: Well nourished (full, rounded cheeks)  Triceps: Well nourished (ample fat tissue)  Muscle Wasting:  Temporalis: Well nourished (well-defined muscle)  Pectoralis (Clavicular Region): Well nourished (clavicle not visible)  Deltoid/Trapezius: Well nourished (rounded appearance at arm, shoulder, neck)  Edema:     Physical Findings:  Skin: Positive (Stage 4 pressure ulcer per admit notes)    Estimated Needs:   Total Energy Estimated Needs (kCal): 2310 kCal  Method for Estimating Needs: 30 kcal/kg Adj. IBW     Method for Estimating Needs: 116-154 gm (1.5-2 gm/kg adj.IBW)     Method for Estimating Needs: 1ml/kcal        Nutrition Diagnosis   Nutrition Diagnosis:  Malnutrition Diagnosis  Patient has Malnutrition Diagnosis: No    Nutrition Diagnosis  Patient has Nutrition Diagnosis: Yes  Diagnosis Status (1): New  Nutrition Diagnosis 1: Increased nutrient needs (Protein)  Related to (1): wound healing  As Evidenced by (1): Stage 4 pressure ulcer       Nutrition Interventions/Recommendations   Nutrition Interventions and Recommendations:        Nutrition Prescription:  Individualized Nutrition Prescription Provided for : Supplements        Nutrition Interventions:   Food and/or Nutrient Delivery Interventions  Interventions: Medical food supplement  Medical Food Supplement: Commercial beverage  Goal: Orange Juan BID         Nutrition Education:   Education Documentation  No documentation found.      Nutrition Counseling  Counseling Theoretical Approach: Nutrition counseling based on health belief model  Goal: Reviewed high protein diet, and supplements       Nutrition Monitoring and Evaluation    Monitoring/Evaluation:   Food/Nutrient Related History Monitoring  Monitoring and Evaluation Plan: Energy intake  Energy Intake: Estimated energy intake  Criteria: PO intake >75%    Body Composition/Growth/Weight History  Monitoring and Evaluation Plan: Weight  Weight: Measured weight  Criteria: Stable weight    Biochemical Data, Medical Tests and Procedures  Monitoring and Evaluation Plan: Electrolyte/renal panel, Glucose/endocrine profile  Electrolyte and Renal Panel: BUN, Sodium, Calcium, ionized, Calcium, serum, Potassium, Phosphorus, Magnesium, Creatinine  Criteria: WNL  Glucose/Endocrine Profile: Glucose, casual  Criteria: WNL    Nutrition Focused Physical Findings  Monitoring and Evaluation Plan: Skin  Skin: Impaired wound healing  Criteria: Promote healing            Time Spent/Follow-up Reminder:   Follow Up  Time Spent (min): 45 minutes  Last Date of Nutrition Visit: 12/05/24  Nutrition Follow-Up Needed?: Dietitian to reassess per policy  Follow up Comment: 12/10-12/11

## 2024-12-05 NOTE — CONSULTS
Infectious Disease Inpatient Consult    Consults  Referred by dr Stella Peter MD: Dipesh Tellez PA-C    Reason For Consult  L buttock OM    History Of Present Illness  Alexis Ruiz is a 29 y.o. male presenting with with PMHx s/f spina bifida, neurogenic bladder with chronic anaya, hypothyroid, ADHD, GERD, asthma, bipolar presenting with left gluteal wound. He states he is brought in by police because he has to get a medical clearance before going to prison. He follows with wound care clinic and with discussing with ED provider, he seems to be having issues with his compliance and it took them several months for him to get an MRI. MRI taken last month revealed he has an osteomyelitis and he was supposed to start on 6 weeks of IV antibiotics starting today. He is unsure when the wound started but there is a documentation of bedside I&D starting in September of 2022 at [facility].       Past Medical History  He has a past medical history of Allergic, Anxiety, Bipolar affective disorder (Multi), Neurogenic bladder, and Spina bifida (Multi).    Surgical History  He has a past surgical history that includes Other surgical history (12/26/2019); Other surgical history (12/26/2019); Other surgical history (12/26/2019); Other surgical history (12/26/2019); and Leg amputation, lower tibia/fibula (Right, 04/2022).     Social History     Occupational History    Not on file   Tobacco Use    Smoking status: Every Day     Current packs/day: 1.00     Types: Cigarettes    Smokeless tobacco: Never   Vaping Use    Vaping status: Every Day    Substances: Nicotine, Flavoring   Substance and Sexual Activity    Alcohol use: Not Currently     Comment: 1-2 drinks per month    Drug use: Never    Sexual activity: Not Currently     Partners: Female     Birth control/protection: None     Travel History   Travel since 11/04/24    No documented travel since 11/04/24          Family History  Family History    Adopted: Yes     Allergies  Ciprofloxacin and Latex     Immunization History   Administered Date(s) Administered    DTaP, Unspecified 1997, 1997, 03/10/1998, 12/09/1998, 08/15/2001    Flu vaccine (IIV4), preservative free *Check age/dose* 03/03/2014, 10/01/2015    Flu vaccine, trivalent, preservative free, age 6 months and greater (Fluarix/Fluzone/Flulaval) 10/19/2009, 11/02/2010    Hepatitis A vaccine, pediatric/adolescent (HAVRIX, VAQTA) 09/10/2009, 05/03/2010    Hepatitis B vaccine, 19 yrs and under (RECOMBIVAX, ENGERIX) 1997, 1997, 07/07/1998    HiB, unspecified 1997, 1997, 03/10/1998, 12/09/1998    Influenza Whole 11/10/2011    Influenza, seasonal, injectable 11/15/2007, 11/27/2012, 10/07/2014    MMR vaccine, subcutaneous (MMR II) 09/10/1998, 08/15/2001    Meningococcal ACWY-D (Menactra) 4-valent conjugate vaccine 09/10/2009, 08/12/2014    Novel influenza-H1N1-09, preservative-free 11/12/2009    Polio, Unspecified 1997, 1997, 12/09/1998    Poliovirus vaccine, subcutaneous (IPOL) 1997, 1997, 12/09/1998, 08/15/2001    Tdap vaccine, age 7 year and older (BOOSTRIX, ADACEL) 09/10/2007, 07/22/2017    Varicella vaccine, subcutaneous (VARIVAX) 12/24/2007, 01/21/2008     Medications  Home medications:  Medications Prior to Admission   Medication Sig Dispense Refill Last Dose/Taking    HySept 0.25 % external solution soak 4x4 and insert at least 3 (THREE) times a week       sulfamethoxazole-trimethoprim (Bactrim DS) 800-160 mg tablet Take 1 tablet by mouth 2 times a day. Indications: infection of bone        Current medications:  Scheduled medications  piperacillin-tazobactam, 4.5 g, intravenous, q6h  polyethylene glycol, 17 g, oral, Daily  [START ON 12/5/2024] vancomycin 1,500 mg in dextrose 5% 500 mL IV, 1,500 mg, intravenous, q12h      Continuous medications     PRN medications  PRN medications: acetaminophen, melatonin, ondansetron, traMADol,  vancomycin    Review of Systems     Objective  Range of Vitals (last 24 hours)  Heart Rate:  []   Temp:  [36.7 °C (98 °F)-37.1 °C (98.7 °F)]   Resp:  [16]   BP: (116-127)/(71-79)   Height:  [182.9 cm (6')]   Weight:  [90.7 kg (200 lb)]   SpO2:  [98 %-100 %]   Daily Weight  24 : 90.7 kg (200 lb)    Body mass index is 27.12 kg/m².     Physical Exam  /71 (BP Location: Left arm, Patient Position: Lying)   Pulse 79   Temp 36.7 °C (98 °F) (Temporal)   Resp 16   Ht 1.829 m (6')   Wt 90.7 kg (200 lb)   SpO2 98%   BMI 27.12 kg/m²   Temp (24hrs), Av.9 °C (98.4 °F), Min:36.7 °C (98 °F), Max:37.1 °C (98.7 °F)      General: alert, oriented, NAD  Lungs: bilaterally clear to auscultation  Heart: regular rate and rhythm  Abdomen: soft, non tender, non distended, BS+  Extremities: no edema  Warm and dry; a golf ball sized wound in left inferior and distal to gluteal cleft, no obvious redness and drainage but malodorous present. See ED provider note/media section for detailed pic.    No rashes  No joint inflammation  Neck supple  Lines ok  No CVAT     Relevant Results    Labs  Results from last 72 hours   Lab Units 24  1407   WBC AUTO x10*3/uL 11.4*   HEMOGLOBIN g/dL 12.4*   HEMATOCRIT % 38.9*   PLATELETS AUTO x10*3/uL 541*   NEUTROS PCT AUTO % 74.1   LYMPHS PCT AUTO % 13.4   MONOS PCT AUTO % 10.0   EOS PCT AUTO % 1.5     Results from last 72 hours   Lab Units 24  1407   SODIUM mmol/L 137   POTASSIUM mmol/L 3.7   CHLORIDE mmol/L 102   CO2 mmol/L 27   BUN mg/dL 7   CREATININE mg/dL 0.65   GLUCOSE mg/dL 97   CALCIUM mg/dL 9.2   ANION GAP mmol/L 12   EGFR mL/min/1.73m*2 >90     Results from last 72 hours   Lab Units 24  1407   ALK PHOS U/L 65   BILIRUBIN TOTAL mg/dL 0.3   PROTEIN TOTAL g/dL 7.4   ALT U/L 6*   AST U/L 8*   ALBUMIN g/dL 3.7     Estimated Creatinine Clearance: 125 mL/min (by C-G formula based on SCr of 0.65 mg/dL).  C-Reactive Protein   Date Value Ref Range Status   2024  "18.15 (H) <1.00 mg/dL Final     Sedimentation Rate   Date Value Ref Range Status   12/04/2024 83 (H) 0 - 15 mm/h Final     HIV 1 and 2 Screen   Date Value Ref Range Status   04/27/2022 NONREACTIVE NONREACTIVE Final     Comment:      HIV Ag/Ab screen is performed using the Siemens GatherllZapstitch   HIV Ag/Ab Combo assay which detects the presence of HIV    p24 antigen as well as antibodies to HIV-1   (Group M and O) and HIV-2.  .  No laboratory evidence of HIV infection. If acute HIV infection is   suspected, consider testing for HIV RNA by PCR (viral load).       Hepatitis C Ab   Date Value Ref Range Status   04/27/2022 NONREACTIVE NONREACTIVE Final     Comment:      Results from patients taking biotin supplements or receiving   high-dose biotin therapy should be interpreted with caution   due to possible interference with this test. Providers may    contact their local laboratory for further information.       Microbiology  Susceptibility data from last 100 days.  Collected Specimen Info Organism   11/05/24 Tissue/Biopsy from Buttock Mixed Anaerobic Bacteria     Mixed Gram-Positive Bacteria            No lab exists for component: \"AGALPCRNB\"                        Imaging  CT abdomen pelvis w IV contrast    Result Date: 12/4/2024  Interpreted By:  Nancy Shen, STUDY: CT ABDOMEN PELVIS W IV CONTRAST;  12/4/2024 4:14 pm   INDICATION: Signs/Symptoms:wound infection on left buttocks, ? worsening osteo.   COMPARISON: CT pelvis 09/27/2024   ACCESSION NUMBER(S): [id number]   ORDERING CLINICIAN: JORGE ALBERTO LORENZO   TECHNIQUE: CT of the abdomen and pelvis was performed.  75 mL Omnipaque 350   FINDINGS: LOWER CHEST: Images of the lung bases show no infiltrate or pleural fluid.   ABDOMEN:   LIVER: There is no hepatic mass.   BILE DUCTS: There is no intrahepatic, common hepatic or common bile ductal dilatation.   GALLBLADDER: The gallbladder is unremarkable.   PANCREAS: The pancreas is unremarkable.   SPLEEN: The spleen is " unremarkable. There is no splenic mass or splenomegaly.   ADRENAL GLANDS: The adrenal glands are unremarkable.   KIDNEYS AND URETERS: The kidneys function symmetrically. The kidneys demonstrate no mass. There is no intrarenal calculus or hydronephrosis. There is a Gutierrez catheter insufflated in a nondistended urinary bladder. The bladder is diffusely thickened.   BOWEL: There is no bowel wall thickening, dilatation or obstruction. There is a large amount of stool throughout the colon.   VESSELS: The abdominal and pelvic vessels are unremarkable.   PERITONEUM/RETROPERITONEUM/LYMPH NODES: There is no retroperitoneal or pelvic adenopathy.  There is no ascites.   ABDOMINAL WALL: The abdominal wall is unremarkable.   BONE AND SOFT TISSUE: There is no acute osseous finding. There are bilateral chronic hip dislocations.   There is a deep open wound involving the left buttock. This opens up into the skin of the posterior surface of the left buttock. The wall is thick. The skin is thickened. This extends to the left inferior pubic ramus. There is sclerosis of the left inferior pubic ramus and periosteal reaction consistent with osteomyelitis. The appearance of the left inferior pubic ramus is unchanged compared to 09/27/2024. There is debris or packing material in this wound but no fluid. This measures 6.8 x 4.0 x 3.9 cm. On 09/27/2024, this measured 2.3 x 1.0 cm. There is a 2nd abscess containing dots of air and a thin enhancing wall involving the medial aspect of the crease of the left buttock. There is no fluid. This measures 3.7 x 2.8 x 2.1 cm. This may be connected to the larger abscess. On 09/27/2024, this measured 2.1 x 1.0 cm. There is induration of the fat of the right buttock consistent with cellulitis with no abscess. There is induration of the fat extends to the right inferior pubic ramus. There is no evidence for osteomyelitis of the right inferior pubic ramus.       1. Deep open wound involving the left buttock  which openly drains out the posterior aspect of the left buttock. This extends to the left inferior pubic ramus. There is sclerosis and periosteal reaction involving the left inferior pubic ramus consistent with osteomyelitis. The appearance of the left inferior pubic ramus is unchanged compared to 09/27/2024. There is debris or packing material within this open wound with no fluid. The size of the wound is larger compared to 09/27/2024. 2. There is a 2nd abscess or walled-off collection containing dots of air involving the subcutaneous fat along the medial aspect of the buttock at the crease. This is larger compared to 09/24/2027. 3. Cellulitis subcutaneous fat right buttock. 4. Gutierrez catheter insufflated in a nondistended urinary bladder. The bladder wall is diffusely thickened. 5. Marked constipation.   MACRO: None   Signed by: Nancy Shen 12/4/2024 4:31 PM Dictation workstation:   [id number]    MR MSK pelvis wo IV contrast    Result Date: 11/26/2024  Interpreted By:  Naty Fritz and Lawrence Austen STUDY: MRI of the pelvis without IV contrast;  11/25/2024 3:15 pm   INDICATION: Signs/Symptoms:confirm osteomyelitis.   ,L89.324 Pressure ulcer of left buttock, stage 4 (Multi)   COMPARISON: CT 09/27/2024.   ACCESSION NUMBER(S): [id number]   ORDERING CLINICIAN: VALERIE FARRELL   TECHNIQUE: MR imaging of the musculoskeletal pelvis was obtained  without administration of intravenous contrast medium.   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 Femoral heads are dysplastic in appearance. SI joints and visualized spine are intact.   OSSEOUS STRUCTURES: Osseous irregularity of the left ischial tuberosity with extensive T1 hypointensity and some T2 hyperintensity through the left inferior pubic ramus and ischial tuberosity. There is adjacent  ossification in the soft tissues inferior to the left ischial tuberosity.   SOFT TISSUES: Large soft tissue ulcer with deep tracts containing fluid and communicating with the skin laterally and with the skin of the gluteal cleft. This tract extends to the left ischial tuberosity and there is adjacent cellulitis. No drainable fluid collection. The sciatic nerves are intact and unremarkable. The left hamstrings tendons are involved of the above-mentioned infection. Otherwise, the visualized muscular and tendinous structures are intact.   INTERNAL ORGANS: Evaluation of the internal organs of the pelvis is limited on this study tailored for evaluation of the musculoskeletal system. No gross abnormality is seen in the pelvic organs. Gutierrez catheter in the urinary bladder.       1. Large soft tissue ulcer over the midline and left gluteal region with adjacent cellulitis and tract communicating to the left ischial tuberosity. There is signal abnormality in the left ischial tuberosity and inferior pubic ramus consistent with osteomyelitis. There is fluid throughout the tract but no discrete fluid collection. 2. 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     I personally reviewed the images/study and I agree with the findings as stated. This study was interpreted at [facility], [place], [place].   MACRO: None   Signed by: Naty Firtz 11/26/2024 9:57 AM Dictation workstation:   [id number]      Echo  No results found for this or any previous visit.    Assessment   OM left ischial tuberosity. There is signal abnormality in the left ischial tuberosity and inferior pubic ramus consistent with osteomyelitis.   NGB  UTI  Spina bifida  Constipation    Plan   Tissue cultures  Bcx x 2  Ucx  Vanco IV  Zosyn  Vanco T  Wound care  CoPAT  anticipated on discharge  De-escalated abx in the next 24-48 hrs    I spent 31 minutes in the professional and overall care of this patient.      Al Young MD

## 2024-12-05 NOTE — PROGRESS NOTES
Alexis Ruiz is a 29 y.o. male on day 1 of admission presenting with Osteomyelitis, unspecified site, unspecified type (Multi).      Subjective     HPI: Alexis Ruiz is a 29 y.o. male with PMHx s/f spina bifida, neurogenic bladder with chronic anaya, hypothyroid, ADHD, GERD, asthma, bipolar presenting with left gluteal wound. He states he is brought in by police because he has to get a medical clearance before going to [facility]. He follows with wound care clinic and with discussing with ED provider, he seems to be having issues with his compliance and it took them several months for him to get an MRI. MRI taken last month revealed he has an osteomyelitis and he was supposed to start on 6 weeks of IV antibiotics starting today. He is unsure when the wound started but there is a documentation of bedside I&D starting in September of 2022 at [facility]. Patient is paraplegic, is wheelchair bound and right leg amputee and reports he hasn't noticed increased odor, drainage, or redness. He notes of mild discomfort with sitting. Denies f/c/n/v, chest pain, sob, abd pain, paresthesias. He denies taking any antibiotics, not on any prescribed mediations.     12/5: Patient was observed laying comfortably in his bed with law enforcement outside the room. Patient states that he is no pain at this time and his last wound care appt was last week. He denies any new symptoms and concerns at this time. Patient was informed that we will have to find out how he can follow up as outpatient for his wound while he is incarcerated. Patient will need inpatient services for another 24-48 hours.     Objective     Last Recorded Vitals  /59 (BP Location: Left arm, Patient Position: Lying)   Pulse 83   Temp 36.5 °C (97.7 °F) (Temporal)   Resp 18   Wt 90.7 kg (200 lb)   SpO2 94%   Intake/Output last 3 Shifts:    Intake/Output Summary (Last 24 hours) at 12/5/2024 1410  Last data filed at 12/5/2024 1342  Gross per 24  hour   Intake 1420 ml   Output 1450 ml   Net -30 ml       Admission Weight  Weight: 90.7 kg (200 lb) (12/04/24 1331)    Daily Weight  12/04/24 : 90.7 kg (200 lb)    Image Results  CT abdomen pelvis w IV contrast  Narrative: Interpreted By:  Nancy Shen,   STUDY:  CT ABDOMEN PELVIS W IV CONTRAST;  12/4/2024 4:14 pm      INDICATION:  Signs/Symptoms:wound infection on left buttocks, ? worsening osteo.      COMPARISON:  CT pelvis 09/27/2024      ACCESSION NUMBER(S):  [id number]      ORDERING CLINICIAN:  JORGE ALBERTO LORENZO      TECHNIQUE:  CT of the abdomen and pelvis was performed.  75 mL Omnipaque 350      FINDINGS:  LOWER CHEST:  Images of the lung bases show no infiltrate or pleural fluid.      ABDOMEN:      LIVER:  There is no hepatic mass.      BILE DUCTS:  There is no intrahepatic, common hepatic or common bile ductal  dilatation.      GALLBLADDER:  The gallbladder is unremarkable.      PANCREAS:  The pancreas is unremarkable.      SPLEEN:  The spleen is unremarkable. There is no splenic mass or splenomegaly.      ADRENAL GLANDS:  The adrenal glands are unremarkable.      KIDNEYS AND URETERS:  The kidneys function symmetrically.  The kidneys demonstrate no mass.  There is no intrarenal calculus or hydronephrosis.  There is a Gutierrez catheter insufflated in a nondistended urinary  bladder. The bladder is diffusely thickened.      BOWEL:  There is no bowel wall thickening, dilatation or obstruction.  There is a large amount of stool throughout the colon.      VESSELS:  The abdominal and pelvic vessels are unremarkable.      PERITONEUM/RETROPERITONEUM/LYMPH NODES:  There is no retroperitoneal or pelvic adenopathy.  There is no  ascites.      ABDOMINAL WALL:  The abdominal wall is unremarkable.      BONE AND SOFT TISSUE:  There is no acute osseous finding.  There are bilateral chronic hip dislocations.      There is a deep open wound involving the left buttock. This opens up  into the skin of the posterior surface  of the left buttock. The wall  is thick. The skin is thickened. This extends to the left inferior  pubic ramus. There is sclerosis of the left inferior pubic ramus and  periosteal reaction consistent with osteomyelitis. The appearance of  the left inferior pubic ramus is unchanged compared to 09/27/2024.  There is debris or packing material in this wound but no fluid. This  measures 6.8 x 4.0 x 3.9 cm. On 09/27/2024, this measured 2.3 x 1.0  cm. There is a 2nd abscess containing dots of air and a thin  enhancing wall involving the medial aspect of the crease of the left  buttock. There is no fluid. This measures 3.7 x 2.8 x 2.1 cm. This  may be connected to the larger abscess. On 09/27/2024, this measured  2.1 x 1.0 cm. There is induration of the fat of the right buttock  consistent with cellulitis with no abscess. There is induration of  the fat extends to the right inferior pubic ramus. There is no  evidence for osteomyelitis of the right inferior pubic ramus.      Impression: 1. Deep open wound involving the left buttock which openly drains out  the posterior aspect of the left buttock. This extends to the left  inferior pubic ramus. There is sclerosis and periosteal reaction  involving the left inferior pubic ramus consistent with  osteomyelitis. The appearance of the left inferior pubic ramus is  unchanged compared to 09/27/2024. There is debris or packing material  within this open wound with no fluid. The size of the wound is larger  compared to 09/27/2024.  2. There is a 2nd abscess or walled-off collection containing dots of  air involving the subcutaneous fat along the medial aspect of the  buttock at the crease. This is larger compared to 09/24/2027.  3. Cellulitis subcutaneous fat right buttock.  4. Gutierrez catheter insufflated in a nondistended urinary bladder. The  bladder wall is diffusely thickened.  5. Marked constipation.      MACRO:  None      Signed by: Nancy Shen 12/4/2024 4:31 PM  Dictation  workstation:   [id number]      Physical Exam  Constitutional:       Appearance: Normal appearance.   HENT:      Head: Normocephalic and atraumatic.      Nose: Nose normal.      Mouth/Throat:      Mouth: Mucous membranes are moist.      Pharynx: No oropharyngeal exudate.   Eyes:      Extraocular Movements: Extraocular movements intact.      Pupils: Pupils are equal, round, and reactive to light.   Cardiovascular:      Rate and Rhythm: Normal rate and regular rhythm.      Pulses: Normal pulses.      Heart sounds: Normal heart sounds.   Pulmonary:      Effort: Pulmonary effort is normal.      Breath sounds: Normal breath sounds.   Abdominal:      General: Abdomen is flat.      Palpations: Abdomen is soft.   Musculoskeletal:         General: Normal range of motion.      Cervical back: Normal range of motion and neck supple.      Comments: Paraplegic, Rt leg amputated.   Skin:     General: Skin is warm and dry.      Comments: There is a golf ball sized wound in left inferior and distal to gluteal cleft.   Neurological:      General: No focal deficit present.      Mental Status: He is alert and oriented to person, place, and time.       Relevant Results  Scheduled medications  piperacillin-tazobactam, 4.5 g, intravenous, q6h  polyethylene glycol, 17 g, oral, Daily  vancomycin 1,500 mg in dextrose 5% 500 mL IV, 1,500 mg, intravenous, q12h      Continuous medications     PRN medications  PRN medications: acetaminophen, melatonin, ondansetron, traMADol, vancomycin     Results for orders placed or performed during the hospital encounter of 12/04/24 (from the past 24 hours)   Urinalysis with Reflex Culture and Microscopic   Result Value Ref Range    Color, Urine Light-Yellow Light-Yellow, Yellow, Dark-Yellow    Appearance, Urine Turbid (N) Clear    Specific Gravity, Urine 1.009 1.005 - 1.035    pH, Urine 8.0 5.0, 5.5, 6.0, 6.5, 7.0, 7.5, 8.0    Protein, Urine 20 (TRACE) NEGATIVE, 10 (TRACE), 20 (TRACE) mg/dL    Glucose,  Urine Normal Normal mg/dL    Blood, Urine NEGATIVE NEGATIVE    Ketones, Urine NEGATIVE NEGATIVE mg/dL    Bilirubin, Urine NEGATIVE NEGATIVE    Urobilinogen, Urine Normal Normal mg/dL    Nitrite, Urine NEGATIVE NEGATIVE    Leukocyte Esterase, Urine 500 Curtis/µL (A) NEGATIVE   Extra Urine Gray Tube   Result Value Ref Range    Extra Tube Hold for add-ons.    Blood Culture    Specimen: Peripheral Venipuncture; Blood culture   Result Value Ref Range    Blood Culture Loaded on Instrument - Culture in progress    Blood Culture    Specimen: Peripheral Venipuncture; Blood culture   Result Value Ref Range    Blood Culture Loaded on Instrument - Culture in progress    Microscopic Only, Urine   Result Value Ref Range    WBC, Urine >50 (A) 1-5, NONE /HPF    RBC, Urine 1-2 NONE, 1-2, 3-5 /HPF    Squamous Epithelial Cells, Urine 1-9 (SPARSE) Reference range not established. /HPF    Bacteria, Urine 1+ (A) NONE SEEN /HPF    Mucus, Urine FEW Reference range not established. /LPF    Triple Phosphate Crystals, Urine 1+ NONE, 1+ /HPF   Comprehensive metabolic panel   Result Value Ref Range    Glucose 88 74 - 99 mg/dL    Sodium 138 136 - 145 mmol/L    Potassium 3.9 3.5 - 5.3 mmol/L    Chloride 103 98 - 107 mmol/L    Bicarbonate 29 21 - 32 mmol/L    Anion Gap 10 10 - 20 mmol/L    Urea Nitrogen 11 6 - 23 mg/dL    Creatinine 0.70 0.50 - 1.30 mg/dL    eGFR >90 >60 mL/min/1.73m*2    Calcium 8.7 8.6 - 10.3 mg/dL    Albumin 3.2 (L) 3.4 - 5.0 g/dL    Alkaline Phosphatase 56 33 - 120 U/L    Total Protein 6.5 6.4 - 8.2 g/dL    AST 6 (L) 9 - 39 U/L    Bilirubin, Total 0.2 0.0 - 1.2 mg/dL    ALT 5 (L) 10 - 52 U/L   CBC   Result Value Ref Range    WBC 10.9 4.4 - 11.3 x10*3/uL    nRBC 0.0 0.0 - 0.0 /100 WBCs    RBC 4.85 4.50 - 5.90 x10*6/uL    Hemoglobin 11.3 (L) 13.5 - 17.5 g/dL    Hematocrit 35.5 (L) 41.0 - 52.0 %    MCV 73 (L) 80 - 100 fL    MCH 23.3 (L) 26.0 - 34.0 pg    MCHC 31.8 (L) 32.0 - 36.0 g/dL    RDW 14.4 11.5 - 14.5 %    Platelets 516 (H)  150 - 450 x10*3/uL        CT abdomen pelvis w IV contrast    Result Date: 12/4/2024  Interpreted By:  Nancy Shen, STUDY: CT ABDOMEN PELVIS W IV CONTRAST;  12/4/2024 4:14 pm   INDICATION: Signs/Symptoms:wound infection on left buttocks, ? worsening osteo.   COMPARISON: CT pelvis 09/27/2024   ACCESSION NUMBER(S): [id number]   ORDERING CLINICIAN: JORGE ALBERTO LORENZO   TECHNIQUE: CT of the abdomen and pelvis was performed.  75 mL Omnipaque 350   FINDINGS: LOWER CHEST: Images of the lung bases show no infiltrate or pleural fluid.   ABDOMEN:   LIVER: There is no hepatic mass.   BILE DUCTS: There is no intrahepatic, common hepatic or common bile ductal dilatation.   GALLBLADDER: The gallbladder is unremarkable.   PANCREAS: The pancreas is unremarkable.   SPLEEN: The spleen is unremarkable. There is no splenic mass or splenomegaly.   ADRENAL GLANDS: The adrenal glands are unremarkable.   KIDNEYS AND URETERS: The kidneys function symmetrically. The kidneys demonstrate no mass. There is no intrarenal calculus or hydronephrosis. There is a Gutierrez catheter insufflated in a nondistended urinary bladder. The bladder is diffusely thickened.   BOWEL: There is no bowel wall thickening, dilatation or obstruction. There is a large amount of stool throughout the colon.   VESSELS: The abdominal and pelvic vessels are unremarkable.   PERITONEUM/RETROPERITONEUM/LYMPH NODES: There is no retroperitoneal or pelvic adenopathy.  There is no ascites.   ABDOMINAL WALL: The abdominal wall is unremarkable.   BONE AND SOFT TISSUE: There is no acute osseous finding. There are bilateral chronic hip dislocations.   There is a deep open wound involving the left buttock. This opens up into the skin of the posterior surface of the left buttock. The wall is thick. The skin is thickened. This extends to the left inferior pubic ramus. There is sclerosis of the left inferior pubic ramus and periosteal reaction consistent with osteomyelitis. The  appearance of the left inferior pubic ramus is unchanged compared to 09/27/2024. There is debris or packing material in this wound but no fluid. This measures 6.8 x 4.0 x 3.9 cm. On 09/27/2024, this measured 2.3 x 1.0 cm. There is a 2nd abscess containing dots of air and a thin enhancing wall involving the medial aspect of the crease of the left buttock. There is no fluid. This measures 3.7 x 2.8 x 2.1 cm. This may be connected to the larger abscess. On 09/27/2024, this measured 2.1 x 1.0 cm. There is induration of the fat of the right buttock consistent with cellulitis with no abscess. There is induration of the fat extends to the right inferior pubic ramus. There is no evidence for osteomyelitis of the right inferior pubic ramus.       1. Deep open wound involving the left buttock which openly drains out the posterior aspect of the left buttock. This extends to the left inferior pubic ramus. There is sclerosis and periosteal reaction involving the left inferior pubic ramus consistent with osteomyelitis. The appearance of the left inferior pubic ramus is unchanged compared to 09/27/2024. There is debris or packing material within this open wound with no fluid. The size of the wound is larger compared to 09/27/2024. 2. There is a 2nd abscess or walled-off collection containing dots of air involving the subcutaneous fat along the medial aspect of the buttock at the crease. This is larger compared to 09/24/2027. 3. Cellulitis subcutaneous fat right buttock. 4. Gutierrez catheter insufflated in a nondistended urinary bladder. The bladder wall is diffusely thickened. 5. Marked constipation.   MACRO: None   Signed by: Nancy Shen 12/4/2024 4:31 PM Dictation workstation:   [id number]             Assessment/Plan      This patient has a urinary catheter   Reason for the urinary catheter remaining today? neurogenic bladder  Assessment & Plan  Osteomyelitis, unspecified site, unspecified type (Multi)    Osteomyelitis of  stage IV left ischial pressure sore, POA   2nd Abscess in medial aspect of buttock  -continue on zosyn, vancomycin  -wound care consult  -blood cultures x2, wound culture collected in ED  -ID consult  12/5: ID recommendations are much appreciated. Will continue Zosyn and Vancomycin at this time and await bcx results.  If the cultures remain negative, patient will likely be discharged on Augmentin and doxycycline for 6 weeks. Wound care recommendations were much appreciated. Bedside RN will cleanse and pack with vashe moistened gauze roll, leaving tail for removal, and cover with foam until seen by wound care. Surgery consult for possible debridement is being considered. Continue pain management PRN and will continue to follow for changes in s/s.    Neurogenic bladder with chronic anaya  UTI with chronic indwelling anaya, POA  -anaya exchange ordered  -continue abx management as above  -urine culture pending  -adjust abx pending cx result  -does not meet sepsis criteria at admission  12/5: Continue current abx regimen and anaya replacements as indicated. Ucx are still pending st this time. Once ucx result abx will be adjusted if necessary.      Marked constipation on CT scan  -Miralax daily    COSME ARRIAGA-S    Pt seen and examined  with my PA student . I have modified the note to reflect my documentation of HPI and assessment and plan.    Get Rivera MD [facility]

## 2024-12-05 NOTE — PROGRESS NOTES
Alexis Ruiz is a 29 y.o. male on day 1 of admission presenting with Osteomyelitis, unspecified site, unspecified type (Multi).      Assessment/Plan:    #L ischial tuberosity OM  Chronic wound likely decubitus ulcer.  Wound overall does not look infected.  Wound cultures have been sent which are pending at this time.  If the cultures remain negative, we will likely discharge the patient on Augmentin and doxycycline for 6 weeks.  Appropriate wound care is also required along with antibiotics for adequate response.    Spina bifida      Recommendations:    -Continue vancomycin and Zosyn  -Pending tissue cultures  -Will continue to follow      Emanuel Tapia MD  Date of service: 12/5/2024  Time of service: 12:02 PM      Subjective   Interval History: No acute events overnight.  Patient denies any new complaint.        Review of Systems  Denies: fever, chills, nausea, vomiting, diarrhea, dysuria    Objective   Range of Vitals (last 24 hours)  Heart Rate:  []   Temp:  [36.4 °C (97.6 °F)-37.1 °C (98.8 °F)]   Resp:  [16-19]   BP: (116-137)/(60-79)   Height:  [182.9 cm (6')]   Weight:  [90.7 kg (200 lb)]   SpO2:  [98 %-100 %]  Daily Weight  12/04/24 : 90.7 kg (200 lb)   Body mass index is 27.12 kg/m².      General: alert, oriented, NAD  HEENT: No conjunctival pallor, no scleral icterus  Neck: No LAD, No JVD  Abdomen: soft, non tender, non distended, BS+  Neuro: AAO x 3, PERRL, paraplegic  Skin: L buttock ulcer with no surrounding erythema or drainage  MSK: No joint inflammation         Antibiotics  piperacillin-tazobactam - 4.5 gram/100 mL  sulfamethoxazole-trimethoprim - 800-160 mg  vancomycin  vancomycin IV 1500 mg in 500 mL D5W (VialMate)      Relevant Results  Labs  Results from last 72 hours   Lab Units 12/05/24  0411 12/04/24  1407   WBC AUTO x10*3/uL 10.9 11.4*   HEMOGLOBIN g/dL 11.3* 12.4*   HEMATOCRIT % 35.5* 38.9*   PLATELETS AUTO x10*3/uL 516* 541*   NEUTROS PCT AUTO %  --  74.1   LYMPHS PCT AUTO %  --   13.4   MONOS PCT AUTO %  --  10.0   EOS PCT AUTO %  --  1.5     Results from last 72 hours   Lab Units 12/05/24  0312 12/04/24  1407   SODIUM mmol/L 138 137   POTASSIUM mmol/L 3.9 3.7   CHLORIDE mmol/L 103 102   CO2 mmol/L 29 27   BUN mg/dL 11 7   CREATININE mg/dL 0.70 0.65   GLUCOSE mg/dL 88 97   CALCIUM mg/dL 8.7 9.2   ANION GAP mmol/L 10 12   EGFR mL/min/1.73m*2 >90 >90     Results from last 72 hours   Lab Units 12/05/24  0312 12/04/24  1407   ALK PHOS U/L 56 65   BILIRUBIN TOTAL mg/dL 0.2 0.3   PROTEIN TOTAL g/dL 6.5 7.4   ALT U/L 5* 6*   AST U/L 6* 8*   ALBUMIN g/dL 3.2* 3.7     Estimated Creatinine Clearance: 125 mL/min (by C-G formula based on SCr of 0.7 mg/dL).  C-Reactive Protein   Date Value Ref Range Status   12/04/2024 18.15 (H) <1.00 mg/dL Final       Microbiology  No results found for the last 14 days.      Imaging    CT abdomen pelvis w IV contrast    Result Date: 12/4/2024  1. Deep open wound involving the left buttock which openly drains out the posterior aspect of the left buttock. This extends to the left inferior pubic ramus. There is sclerosis and periosteal reaction involving the left inferior pubic ramus consistent with osteomyelitis. The appearance of the left inferior pubic ramus is unchanged compared to 09/27/2024. There is debris or packing material within this open wound with no fluid. The size of the wound is larger compared to 09/27/2024. 2. There is a 2nd abscess or walled-off collection containing dots of air involving the subcutaneous fat along the medial aspect of the buttock at the crease. This is larger compared to 09/24/2027. 3. Cellulitis subcutaneous fat right buttock. 4. Gutierrez catheter insufflated in a nondistended urinary bladder. The bladder wall is diffusely thickened. 5. Marked constipation.   MACRO: None   Signed by: Nancy Shen 12/4/2024 4:31 PM Dictation workstation:   [id number]    MR MSK pelvis wo IV contrast    Result Date: 11/26/2024  1. Large soft tissue ulcer  over the midline and left gluteal region with adjacent cellulitis and tract communicating to the left ischial tuberosity. There is signal abnormality in the left ischial tuberosity and inferior pubic ramus consistent with osteomyelitis. There is fluid throughout the tract but no discrete fluid collection. 2. 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     I personally reviewed the images/study and I agree with the findings as stated. This study was interpreted at [facility], [place], [place].   MACRO: None   Signed by: Naty Fritz 11/26/2024 9:57 AM Dictation workstation:   [id number]

## 2024-12-05 NOTE — CARE PLAN
The patient's goals for the shift include      The clinical goals for the shift include remain hemodynamically safe hrough shift    Over the shift, the patient did not make progress toward the following goals. Barriers to progression include n/a. Recommendations to address these barriers include n/a.

## 2024-12-05 NOTE — PROGRESS NOTES
Occupational Therapy                 Therapy Communication Note    Patient Name: Alexis Ruiz  MRN: [id number]  Department: [facility] 3 E  Room: [facility]3316-A  Today's Date: 12/5/2024     Discipline: Occupational Therapy     Missed Visit Reason: Other (Comment) (Pt. states that he feels at baseline for ADLs and trfrs. to/from wheelchair.  UEs WNL  No O.T. needs identified.  Discharge O.T.)

## 2024-12-05 NOTE — CONSULTS
Unable to see patient for wound consult at this time. Chart reviewed including admission photo of stage 4 to left buttock. Patient previously seen at wound center on 11/26. Bedside RN (Jigna) to measure wound then cleanse and pack with vashe moistened gauze roll, leaving tail for removal, cover with foam until seen by wound care. Culture obtained on 12/4.

## 2024-12-06 ENCOUNTER — PHARMACY VISIT (OUTPATIENT)
Dept: PHARMACY | Facility: CLINIC | Age: 29
End: 2024-12-06
Payer: COMMERCIAL

## 2024-12-06 ENCOUNTER — TELEPHONE (OUTPATIENT)
Dept: PRIMARY CARE | Facility: CLINIC | Age: 29
End: 2024-12-06
Payer: COMMERCIAL

## 2024-12-06 VITALS
OXYGEN SATURATION: 97 % | WEIGHT: 200 LBS | HEIGHT: 72 IN | BODY MASS INDEX: 27.09 KG/M2 | DIASTOLIC BLOOD PRESSURE: 75 MMHG | HEART RATE: 85 BPM | TEMPERATURE: 97.8 F | SYSTOLIC BLOOD PRESSURE: 120 MMHG | RESPIRATION RATE: 18 BRPM

## 2024-12-06 LAB
BACTERIA SPEC CULT: ABNORMAL
BACTERIA UR CULT: ABNORMAL
GRAM STN SPEC: ABNORMAL
GRAM STN SPEC: ABNORMAL
VANCOMYCIN SERPL-MCNC: 15.8 UG/ML (ref 5–20)

## 2024-12-06 PROCEDURE — 99239 HOSP IP/OBS DSCHRG MGMT >30: CPT | Performed by: INTERNAL MEDICINE

## 2024-12-06 PROCEDURE — 36415 COLL VENOUS BLD VENIPUNCTURE: CPT

## 2024-12-06 PROCEDURE — RXMED WILLOW AMBULATORY MEDICATION CHARGE

## 2024-12-06 PROCEDURE — 2500000004 HC RX 250 GENERAL PHARMACY W/ HCPCS (ALT 636 FOR OP/ED)

## 2024-12-06 PROCEDURE — 99233 SBSQ HOSP IP/OBS HIGH 50: CPT | Performed by: INTERNAL MEDICINE

## 2024-12-06 PROCEDURE — 80202 ASSAY OF VANCOMYCIN: CPT

## 2024-12-06 PROCEDURE — 2500000001 HC RX 250 WO HCPCS SELF ADMINISTERED DRUGS (ALT 637 FOR MEDICARE OP): Performed by: STUDENT IN AN ORGANIZED HEALTH CARE EDUCATION/TRAINING PROGRAM

## 2024-12-06 RX ORDER — AMOXICILLIN AND CLAVULANATE POTASSIUM 875; 125 MG/1; MG/1
875 TABLET, FILM COATED ORAL 2 TIMES DAILY
Qty: 60 TABLET | Refills: 1 | Status: SHIPPED | OUTPATIENT
Start: 2024-12-06 | End: 2025-01-17

## 2024-12-06 RX ORDER — AMOXICILLIN AND CLAVULANATE POTASSIUM 875; 125 MG/1; MG/1
1 TABLET, FILM COATED ORAL EVERY 12 HOURS SCHEDULED
Status: DISCONTINUED | OUTPATIENT
Start: 2024-12-06 | End: 2024-12-06 | Stop reason: HOSPADM

## 2024-12-06 RX ORDER — DOXYCYCLINE 100 MG/1
100 CAPSULE ORAL 2 TIMES DAILY
Qty: 60 CAPSULE | Refills: 1 | Status: SHIPPED | OUTPATIENT
Start: 2024-12-06 | End: 2025-01-17

## 2024-12-06 RX ORDER — DOXYCYCLINE 100 MG/1
100 CAPSULE ORAL EVERY 12 HOURS SCHEDULED
Status: DISCONTINUED | OUTPATIENT
Start: 2024-12-06 | End: 2024-12-06 | Stop reason: HOSPADM

## 2024-12-06 ASSESSMENT — COGNITIVE AND FUNCTIONAL STATUS - GENERAL
DAILY ACTIVITIY SCORE: 24
CLIMB 3 TO 5 STEPS WITH RAILING: TOTAL
WALKING IN HOSPITAL ROOM: TOTAL
MOBILITY SCORE: 18

## 2024-12-06 ASSESSMENT — PAIN SCALES - GENERAL: PAINLEVEL_OUTOF10: 0 - NO PAIN

## 2024-12-06 NOTE — PROGRESS NOTES
Vancomycin Dosing by Pharmacy- FOLLOW UP    Alexis Ruiz is a 29 y.o. year old male who Pharmacy has been consulted for vancomycin dosing for cellulitis, skin and soft tissue. Based on the patient's indication and renal status this patient is being dosed based on a goal AUC of 500-600.     Renal function is currently stable.    Current vancomycin dose: 1500 mg given every 12 hours    Estimated vancomycin AUC on current dose: 501 mg/L.hr     Visit Vitals  /76 (BP Location: Left arm, Patient Position: Lying)   Pulse 78   Temp 36.6 °C (97.8 °F) (Temporal)   Resp 17        Lab Results   Component Value Date    CREATININE 0.70 2024    CREATININE 0.65 2024    CREATININE 0.72 2022    CREATININE 0.77 2022    CREATININE 0.79 2022    CREATININE 0.70 2022        Patient weight is as follows:   Vitals:    24 1331   Weight: 90.7 kg (200 lb)       Cultures:  Susceptibility data for the encounter in last 14 days.  Collected Specimen Info Organism   24 Urine from Clean Catch/Voided Gram Negative Bacilli   24 Tissue/Biopsy from Wound/Tissue Mixed Gram-Positive and Gram-Negative Bacteria        I/O last 3 completed shifts:  In: 1620 (17.9 mL/kg) [P.O.:720; IV Piggyback:900]  Out: 1925 (21.2 mL/kg) [Urine:1925 (0.6 mL/kg/hr)]  Weight: 90.7 kg   I/O during current shift:  No intake/output data recorded.    Temp (24hrs), Av.7 °C (98 °F), Min:36.4 °C (97.6 °F), Max:37.1 °C (98.7 °F)      Assessment/Plan    Within goal AUC range. Continue current vancomycin regimen.    This dosing regimen is predicted by [employer] to result in the following pharmacokinetic parameters:  Loading dose: N/A  Regimen: 1500 mg IV every 12 hours.  Start time: 09:21 on 2024  Exposure target: AUC24 (range)400-600 mg/L.hr   [id number]: 489 mg/L.hr  AUC24,ss: 501 mg/L.hr  Probability of AUC24 > 400: 92 %  Ctrough,ss: 14.5 mg/L  Probability of Ctrough,ss > 20: 15 %      The next level will be  obtained on 12/9 at 0500. May be obtained sooner if clinically indicated.   Will continue to monitor renal function daily while on vancomycin and order serum creatinine at least every 48 hours if not already ordered.  Follow for continued vancomycin needs, clinical response, and signs/symptoms of toxicity.       Joseph Bravo, PharmD

## 2024-12-06 NOTE — PROGRESS NOTES
Alexis Ruiz is a 29 y.o. male on day 2 of admission presenting with Osteomyelitis, unspecified site, unspecified type (Multi).      Subjective     HPI: Alexis Ruiz is a 29 y.o. male with PMHx s/f spina bifida, neurogenic bladder with chronic anaya, hypothyroid, ADHD, GERD, asthma, bipolar presenting with left gluteal wound. He states he is brought in by police because he has to get a medical clearance before going to residential. He follows with wound care clinic and with discussing with ED provider, he seems to be having issues with his compliance and it took them several months for him to get an MRI. MRI taken last month revealed he has an osteomyelitis and he was supposed to start on 6 weeks of IV antibiotics starting today. He is unsure when the wound started but there is a documentation of bedside I&D starting in September of 2022 at [facility]. Patient is paraplegic, is wheelchair bound and right leg amputee and reports he hasn't noticed increased odor, drainage, or redness. He notes of mild discomfort with sitting. Denies f/c/n/v, chest pain, sob, abd pain, paresthesias. He denies taking any antibiotics, not on any prescribed mediations.      12/5: Patient was observed laying comfortably in his bed with law enforcement outside the room. Patient states that he is no pain at this time and his last wound care appt was last week. He denies any new symptoms and concerns at this time. Patient was informed that we will have to find out how he can follow up as outpatient for his wound while he is incarcerated. Patient will need inpatient services for another 24-48 hours.    12/6: No acute events overnight. Patient was observed sleeping comfortably in his bed with law enforcement outside the room. Patient will be discharged today.         Objective     Last Recorded Vitals  /76 (BP Location: Left arm, Patient Position: Lying)   Pulse 78   Temp 36.6 °C (97.8 °F) (Temporal)   Resp 17   Wt 90.7  kg (200 lb)   SpO2 99%   Intake/Output last 3 Shifts:    Intake/Output Summary (Last 24 hours) at 12/6/2024 1007  Last data filed at 12/6/2024 0643  Gross per 24 hour   Intake 1180 ml   Output 1525 ml   Net -345 ml     Admission Weight  Weight: 90.7 kg (200 lb) (12/04/24 1331)    Daily Weight  12/04/24 : 90.7 kg (200 lb)    Physical Exam  Constitutional:       Appearance: Normal appearance.   HENT:      Head: Normocephalic and atraumatic.      Nose: Nose normal.      Mouth/Throat:      Mouth: Mucous membranes are moist.      Pharynx: No oropharyngeal exudate.   Eyes:      Extraocular Movements: Extraocular movements intact.      Pupils: Pupils are equal, round, and reactive to light.   Cardiovascular:      Rate and Rhythm: Normal rate and regular rhythm.      Pulses: Normal pulses.      Heart sounds: Normal heart sounds.   Pulmonary:      Effort: Pulmonary effort is normal.      Breath sounds: Normal breath sounds.   Abdominal:      General: Abdomen is flat.      Palpations: Abdomen is soft.   Musculoskeletal:         General: Normal range of motion.      Cervical back: Normal range of motion and neck supple.      Comments: Paraplegic, Rt leg amputated.   Skin:     General: Skin is warm and dry.      Comments: There is a golf ball sized wound in left inferior and distal to gluteal cleft.   Neurological:      General: No focal deficit present.      Mental Status: He is alert and oriented to person, place, and time.   Relevant Results    Scheduled medications  piperacillin-tazobactam, 4.5 g, intravenous, q6h  polyethylene glycol, 17 g, oral, Daily  vancomycin 1,500 mg in dextrose 5% 500 mL IV, 1,500 mg, intravenous, q12h      Continuous medications     PRN medications  PRN medications: acetaminophen, melatonin, ondansetron, traMADol, vancomycin    Results for orders placed or performed during the hospital encounter of 12/04/24 (from the past 24 hours)   Vancomycin   Result Value Ref Range    Vancomycin 15.8 5.0 -  20.0 ug/mL        CT abdomen pelvis w IV contrast    Result Date: 12/4/2024  Interpreted By:  Nancy Shen, STUDY: CT ABDOMEN PELVIS W IV CONTRAST;  12/4/2024 4:14 pm   INDICATION: Signs/Symptoms:wound infection on left buttocks, ? worsening osteo.   COMPARISON: CT pelvis 09/27/2024   ACCESSION NUMBER(S): [id number]   ORDERING CLINICIAN: JORGE ALBERTO LORENZO   TECHNIQUE: CT of the abdomen and pelvis was performed.  75 mL Omnipaque 350   FINDINGS: LOWER CHEST: Images of the lung bases show no infiltrate or pleural fluid.   ABDOMEN:   LIVER: There is no hepatic mass.   BILE DUCTS: There is no intrahepatic, common hepatic or common bile ductal dilatation.   GALLBLADDER: The gallbladder is unremarkable.   PANCREAS: The pancreas is unremarkable.   SPLEEN: The spleen is unremarkable. There is no splenic mass or splenomegaly.   ADRENAL GLANDS: The adrenal glands are unremarkable.   KIDNEYS AND URETERS: The kidneys function symmetrically. The kidneys demonstrate no mass. There is no intrarenal calculus or hydronephrosis. There is a Gutierrez catheter insufflated in a nondistended urinary bladder. The bladder is diffusely thickened.   BOWEL: There is no bowel wall thickening, dilatation or obstruction. There is a large amount of stool throughout the colon.   VESSELS: The abdominal and pelvic vessels are unremarkable.   PERITONEUM/RETROPERITONEUM/LYMPH NODES: There is no retroperitoneal or pelvic adenopathy.  There is no ascites.   ABDOMINAL WALL: The abdominal wall is unremarkable.   BONE AND SOFT TISSUE: There is no acute osseous finding. There are bilateral chronic hip dislocations.   There is a deep open wound involving the left buttock. This opens up into the skin of the posterior surface of the left buttock. The wall is thick. The skin is thickened. This extends to the left inferior pubic ramus. There is sclerosis of the left inferior pubic ramus and periosteal reaction consistent with osteomyelitis. The appearance of  the left inferior pubic ramus is unchanged compared to 09/27/2024. There is debris or packing material in this wound but no fluid. This measures 6.8 x 4.0 x 3.9 cm. On 09/27/2024, this measured 2.3 x 1.0 cm. There is a 2nd abscess containing dots of air and a thin enhancing wall involving the medial aspect of the crease of the left buttock. There is no fluid. This measures 3.7 x 2.8 x 2.1 cm. This may be connected to the larger abscess. On 09/27/2024, this measured 2.1 x 1.0 cm. There is induration of the fat of the right buttock consistent with cellulitis with no abscess. There is induration of the fat extends to the right inferior pubic ramus. There is no evidence for osteomyelitis of the right inferior pubic ramus.       1. Deep open wound involving the left buttock which openly drains out the posterior aspect of the left buttock. This extends to the left inferior pubic ramus. There is sclerosis and periosteal reaction involving the left inferior pubic ramus consistent with osteomyelitis. The appearance of the left inferior pubic ramus is unchanged compared to 09/27/2024. There is debris or packing material within this open wound with no fluid. The size of the wound is larger compared to 09/27/2024. 2. There is a 2nd abscess or walled-off collection containing dots of air involving the subcutaneous fat along the medial aspect of the buttock at the crease. This is larger compared to 09/24/2027. 3. Cellulitis subcutaneous fat right buttock. 4. Gutierrez catheter insufflated in a nondistended urinary bladder. The bladder wall is diffusely thickened. 5. Marked constipation.   MACRO: None   Signed by: Nancy Shen 12/4/2024 4:31 PM Dictation workstation:   [id number]               Assessment/Plan      Assessment & Plan  Osteomyelitis, unspecified site, unspecified type (Multi)    Osteomyelitis of stage IV left ischial pressure sore, POA   2nd Abscess in medial aspect of buttock  -continue on zosyn, vancomycin  -wound  care consult  -blood cultures x2, wound culture collected in ED  -ID consult  12/5: ID recommendations are much appreciated. Will continue Zosyn and Vancomycin at this time and await bcx results.  If the cultures remain negative, patient will likely be discharged on Augmentin and doxycycline for 6 weeks. Wound care recommendations were much appreciated. Bedside RN will cleanse and pack with vashe moistened gauze roll, leaving tail for removal, and cover with foam until seen by wound care. Surgery consult for possible debridement is being considered. Continue pain management PRN and will continue to follow for changes in s/s.  12/6: ID recommendations are much appreciated. Zosyn and Vancomycin will be discontinued at this time. Start Augmentin 875 every 12 hours and Doxy 100 mg every 12 hours through 01/14/2025. Blood cultures are negative initially. Wound cultures are negative for MRSA or Pseudomonas. Patient is okay to be discharged from ID standpoint.     Neurogenic bladder with chronic anaya  UTI with chronic indwelling anaya, POA  -anaya exchange ordered  -continue abx management as above  -urine culture pending  -adjust abx pending cx result  -does not meet sepsis criteria at admission  12/5: Continue current abx regimen and anaya replacements as indicated. Ucx are still pending st this time. Once ucx result abx will be adjusted if necessary.   12/6: Urine cultures resulted and are positive for gram negative bacilli. Will continue anaya replacements as indicated and PO abx regimen as above.     Marked constipation on CT scan  -Miralax daily    LALA PETERSEN. PA-S    Pt seen and examined  with my PA student . I have modified the note to reflect my documentation of HPI and assessment and plan.    Get Rivera MD [facility]

## 2024-12-06 NOTE — PROGRESS NOTES
Vancomycin Dosing by Pharmacy- Cessation of Therapy    Consult to pharmacy for vancomycin dosing has been discontinued by the prescriber, pharmacy will sign off at this time.    Please call pharmacy if there are further questions or re-enter a consult if vancomycin is resumed.     Joseph Bravo, PharmD

## 2024-12-06 NOTE — CARE PLAN
The patient's goals for the shift include      The clinical goals for the shift include remain hemodynamically safe through shift    Over the shift, the patient did not make progress toward the following goals. Barriers to progression include n/a. Recommendations to address these barriers include n/a.

## 2024-12-06 NOTE — CARE PLAN
The patient's goals for the shift include      The clinical goals for the shift include Patient  will be free from falls this shift.    Over the shift, the patient remains free from falls.

## 2024-12-06 NOTE — PROGRESS NOTES
Physical Therapy                 Therapy Communication Note    Patient Name: Alexis Ruiz  MRN: [id number]  Department: [facility] 3 E  Room: [facility]3316-A  Today's Date: 12/6/2024     Discipline: Physical Therapy    Missed Visit Reason: Missed Visit Reason:  ([facility], WHEN OFFERED MOBILTIY AT BED LEVEL OR UP TO CHAIR, DOESNOT WANT THERAPY TO RETURN CHECK ON HIM, WILLDISCH FROM P.T., INFORMED PT IFHE CHANGES HIS MIND TO HAVE DR REORDER THERAPY)

## 2024-12-06 NOTE — PROGRESS NOTES
Alexis Ruiz is a 29 y.o. male on day 2 of admission presenting with Osteomyelitis, unspecified site, unspecified type (Multi).      Assessment/Plan:     #L ischial tuberosity OM  Chronic wound likely decubitus ulcer.  Wound overall does not look infected.  Wound cultures did not show any Pseudomonas or MRSA.  Patient denies any long-term use of antibiotics in the past to treat osteomyelitis. Appropriate wound care is also required along with antibiotics for adequate response.      Spina bifida        Recommendations:     -DC vancomycin and Zosyn  -Start Augmentin 875 every 12 hours and Doxy 100 mg every 12 hours through 01/14/2025 to finish 6-week course of antibiotics  -Patient can be discharged from ID standpoint    NOTE: Discussed in detail about the side effects of doxycycline including GI distress, photosensitivity, pill esophagitis.  Instructed patient to not take the antibiotic with any dairy products as it decreases the bioavailability. Also discussed [employer] of Augmentin which includes nausea, vomiting and C. difficile.  Patient understands the side effects and agrees to take the antibiotic.         Emanuel Tapia MD  Date of service: 12/6/2024  Time of service: 1:13 PM      Subjective   Interval History: No acute events overnight.  Patient denies any new complaint.        Review of Systems  Denies: fever, chills, nausea, vomiting, diarrhea, dysuria    Objective   Range of Vitals (last 24 hours)  Heart Rate:  [58-86]   Temp:  [36.5 °C (97.7 °F)-37.1 °C (98.7 °F)]   Resp:  [17-18]   BP: ()/(59-76)   SpO2:  [94 %-100 %]  Daily Weight  12/04/24 : 90.7 kg (200 lb)   Body mass index is 27.12 kg/m².    General: alert, oriented, NAD  HEENT: No conjunctival pallor, no scleral icterus  Neck: No LAD, No JVD  Abdomen: soft, non tender, non distended, BS+  Neuro: AAO x 3, PERRL, paraplegic  Skin: L buttock ulcer with no surrounding erythema or drainage  MSK: No joint  inflammation    Antibiotics  piperacillin-tazobactam - 4.5 gram/100 mL  sulfamethoxazole-trimethoprim - 800-160 mg  vancomycin  vancomycin IV 1500 mg in 500 mL D5W (VialMate)      Relevant Results  Labs  Results from last 72 hours   Lab Units 12/05/24  0411 12/04/24  1407   WBC AUTO x10*3/uL 10.9 11.4*   HEMOGLOBIN g/dL 11.3* 12.4*   HEMATOCRIT % 35.5* 38.9*   PLATELETS AUTO x10*3/uL 516* 541*   NEUTROS PCT AUTO %  --  74.1   LYMPHS PCT AUTO %  --  13.4   MONOS PCT AUTO %  --  10.0   EOS PCT AUTO %  --  1.5     Results from last 72 hours   Lab Units 12/05/24  0312 12/04/24  1407   SODIUM mmol/L 138 137   POTASSIUM mmol/L 3.9 3.7   CHLORIDE mmol/L 103 102   CO2 mmol/L 29 27   BUN mg/dL 11 7   CREATININE mg/dL 0.70 0.65   GLUCOSE mg/dL 88 97   CALCIUM mg/dL 8.7 9.2   ANION GAP mmol/L 10 12   EGFR mL/min/1.73m*2 >90 >90     Results from last 72 hours   Lab Units 12/05/24  0312 12/04/24  1407   ALK PHOS U/L 56 65   BILIRUBIN TOTAL mg/dL 0.2 0.3   PROTEIN TOTAL g/dL 6.5 7.4   ALT U/L 5* 6*   AST U/L 6* 8*   ALBUMIN g/dL 3.2* 3.7     Estimated Creatinine Clearance: 125 mL/min (by C-G formula based on SCr of 0.7 mg/dL).  C-Reactive Protein   Date Value Ref Range Status   12/04/2024 18.15 (H) <1.00 mg/dL Final       Microbiology  Susceptibility data from last 14 days.  Collected Specimen Info Organism   12/04/24 Urine from Clean Catch/Voided Gram Negative Bacilli   12/04/24 Tissue/Biopsy from Wound/Tissue Mixed Gram-Positive and Gram-Negative Bacteria       Imaging    CT abdomen pelvis w IV contrast    Result Date: 12/4/2024  1. Deep open wound involving the left buttock which openly drains out the posterior aspect of the left buttock. This extends to the left inferior pubic ramus. There is sclerosis and periosteal reaction involving the left inferior pubic ramus consistent with osteomyelitis. The appearance of the left inferior pubic ramus is unchanged compared to 09/27/2024. There is debris or packing material within this  open wound with no fluid. The size of the wound is larger compared to 09/27/2024. 2. There is a 2nd abscess or walled-off collection containing dots of air involving the subcutaneous fat along the medial aspect of the buttock at the crease. This is larger compared to 09/24/2027. 3. Cellulitis subcutaneous fat right buttock. 4. Gutierrez catheter insufflated in a nondistended urinary bladder. The bladder wall is diffusely thickened. 5. Marked constipation.   MACRO: None   Signed by: Nancy Shen 12/4/2024 4:31 PM Dictation workstation:   [id number]    MR MSK pelvis wo IV contrast    Result Date: 11/26/2024  1. Large soft tissue ulcer over the midline and left gluteal region with adjacent cellulitis and tract communicating to the left ischial tuberosity. There is signal abnormality in the left ischial tuberosity and inferior pubic ramus consistent with osteomyelitis. There is fluid throughout the tract but no discrete fluid collection. 2. 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     I personally reviewed the images/study and I agree with the findings as stated. This study was interpreted at [facility], [place], [place].   MACRO: None   Signed by: Naty Fritz 11/26/2024 9:57 AM Dictation workstation:   [id number]

## 2024-12-06 NOTE — DISCHARGE SUMMARY
Discharge Diagnosis  Osteomyelitis of stage IV left ischial pressure sore, POA  Spina bifida  Neurogenic bladder with chronic anaya  UTI with chronic indwelling anaya, POA  Marked constipation on CT scan    Issues Requiring Follow-Up  Please follow up with PCP following discharge.     Discharge Meds     Medication List      ASK your doctor about these medications     Bactrim -160 mg tablet; Generic drug:   sulfamethoxazole-trimethoprim   HySept 0.25 % external solution; Generic drug: sodium hypochlorite       Test Results Pending At Discharge  Pending Labs       Order Current Status    Blood Culture Preliminary result    Blood Culture Preliminary result    Urine Culture Preliminary result            Hospital Course  HPI: Alexis Ruiz is a 29 y.o. male with PMHx s/f spina bifida, neurogenic bladder with chronic anaya, hypothyroid, ADHD, GERD, asthma, bipolar presenting with left gluteal wound. He states he is brought in by police because he has to get a medical clearance before going to detention. He follows with wound care clinic and with discussing with ED provider, he seems to be having issues with his compliance and it took them several months for him to get an MRI. MRI taken last month revealed he has an osteomyelitis and he was supposed to start on 6 weeks of IV antibiotics starting today. He is unsure when the wound started but there is a documentation of bedside I&D starting in September of 2022 at [facility]. Patient is paraplegic, is wheelchair bound and right leg amputee and reports he hasn't noticed increased odor, drainage, or redness. He notes of mild discomfort with sitting. Denies f/c/n/v, chest pain, sob, abd pain, paresthesias. He denies taking any antibiotics, not on any prescribed mediations.      12/5: Patient was observed laying comfortably in his bed with law enforcement outside the room. Patient states that he is no pain at this time and his last wound care appt was last  week. He denies any new symptoms and concerns at this time. Patient was informed that we will have to find out how he can follow up as outpatient for his wound while he is incarcerated. Patient will need inpatient services for another 24-48 hours.     12/6: No acute events overnight. Patient was observed sleeping comfortably in his bed with law enforcement outside the room. Patient will be discharged today.  ID recommendations appreciated.  We will discharge on Augmentin 875 mg every 12 hours and doxycycline 100 mg every 12 hours through 01/14/2025 to finish 6 week course of antibiotics.       Pertinent Physical Exam At Time of Discharge  Physical Exam  Constitutional:       Appearance: Normal appearance.   HENT:      Head: Normocephalic and atraumatic.      Nose: Nose normal.      Mouth/Throat:      Mouth: Mucous membranes are moist.      Pharynx: No oropharyngeal exudate.   Eyes:      Extraocular Movements: Extraocular movements intact.      Pupils: Pupils are equal, round, and reactive to light.   Cardiovascular:      Rate and Rhythm: Normal rate and regular rhythm.      Pulses: Normal pulses.      Heart sounds: Normal heart sounds.   Pulmonary:      Effort: Pulmonary effort is normal.      Breath sounds: Normal breath sounds.   Abdominal:      General: Abdomen is flat.      Palpations: Abdomen is soft.   Musculoskeletal:         General: Normal range of motion.      Cervical back: Normal range of motion and neck supple.      Comments: Paraplegic, Rt leg amputated.   Skin:     General: Skin is warm and dry.      Comments: There is a golf ball sized wound in left inferior and distal to gluteal cleft.   Neurological:      General: No focal deficit present.      Mental Status: He is alert and oriented to person, place, and time.     Outpatient Follow-Up  Future Appointments   Date Time Provider Department Center   12/10/2024  2:00 PM Fawn Smith, APRN-CNP [id number]     Discharge planning took more  than 30 minutes.     LALA PETERSEN

## 2024-12-06 NOTE — TELEPHONE ENCOUNTER
Heidi RN with [facility] called rx line @ 10:04 wanting to let doctor know that [facility] for Osteomyelitis. Being treated with IV ABX, hopefully moving to oral ABX soon. No discharge date at this time. Thanks. SHEELA

## 2024-12-08 LAB
BACTERIA BLD CULT: NORMAL
BACTERIA BLD CULT: NORMAL

## 2024-12-10 ENCOUNTER — PATIENT OUTREACH (OUTPATIENT)
Dept: PRIMARY CARE | Facility: CLINIC | Age: 29
End: 2024-12-10
Payer: COMMERCIAL

## 2024-12-10 NOTE — TELEPHONE ENCOUNTER
----- Message from Corie Bliss sent at 12/10/2024  9:52 AM EST -----  Regarding: FW: TCM/ No contact    ----- Message -----  From: Jacques Farias LPN  Sent: 12/10/2024   9:43 AM EST  To: #  Subject: TCM/ No contact                                  Discharge Facility: [facility]   Discharge Diagnosis: Osteomyelitis of stage IV left ischial pressure sore, POA  Spina bifida  Neurogenic bladder with chronic anaya  UTI with chronic indwelling anaya, POA  Marked constipation on CT scan  Admission Date: 12-4-24  Discharge Date: 12-6-24     Unsuccessful attempts x2 to reach patient for PCP Follow-up  Please have office staff reach out to patient and schedule an appointment within 14 days from discharge date.

## 2024-12-10 NOTE — PROGRESS NOTES
Discharge Facility: [facility]   Discharge Diagnosis: Osteomyelitis of stage IV left ischial pressure sore, POA  Spina bifida  Neurogenic bladder with chronic anaya  UTI with chronic indwelling anaya, POA  Marked constipation on CT scan  Admission Date: 12-4-24  Discharge Date: 12-6-24    PCP Appointment Date: Message routed to office for scheduling     Specialist Appointment Date:  Wound Care  Hospital Encounter and Summary Linked: Yes  See discharge assessment below for further details  Two attempts were made to reach patient within two business days after discharge. Voicemail left with contact information for patient to call back with any non-emergent questions or concerns.    Pt. Is incarcerated at this time.

## 2024-12-11 NOTE — TELEPHONE ENCOUNTER
Called Heidi and informed pt was given Bactrim by ED and according to his chart, pt is not allergric to this med via vm

## 2024-12-11 NOTE — TELEPHONE ENCOUNTER
Heidi Newby pts  called stating patient was discharged from the hospital and is currently in [facility]. Heidi states pt father had issues with DC for pt and now they don't know which abx pt is supposed to be taking post discharge. Heidi states the DC mentions Bactrim which father though pt had allergy to and also mentioned Augmentin and Doxycycline. Heidi asked if we had clarification about this. Please advise how to proceed? Thanks, CG

## 2024-12-19 ENCOUNTER — APPOINTMENT (OUTPATIENT)
Dept: WOUND CARE | Facility: CLINIC | Age: 29
End: 2024-12-19
Payer: COMMERCIAL

## 2024-12-20 ENCOUNTER — OFFICE VISIT (OUTPATIENT)
Dept: WOUND CARE | Facility: CLINIC | Age: 29
End: 2024-12-20
Payer: COMMERCIAL

## 2024-12-20 PROCEDURE — 11042 DBRDMT SUBQ TIS 1ST 20SQCM/<: CPT

## 2025-01-03 ENCOUNTER — HOME CARE VISIT (OUTPATIENT)
Dept: HOME HEALTH SERVICES | Facility: HOME HEALTH | Age: 30
End: 2025-01-03
Payer: COMMERCIAL

## 2025-01-03 PROCEDURE — G0299 HHS/HOSPICE OF RN EA 15 MIN: HCPCS

## 2025-01-05 VITALS
SYSTOLIC BLOOD PRESSURE: 126 MMHG | RESPIRATION RATE: 12 BRPM | OXYGEN SATURATION: 97 % | HEART RATE: 78 BPM | TEMPERATURE: 97.3 F | DIASTOLIC BLOOD PRESSURE: 70 MMHG

## 2025-01-05 ASSESSMENT — ENCOUNTER SYMPTOMS
FATIGUE: 1
CHANGE IN APPETITE: UNCHANGED
MUSCLE WEAKNESS: 1
LAST BOWEL MOVEMENT: 67208
HIGHEST PAIN SEVERITY IN PAST 24 HOURS: 0/10
STOOL FREQUENCY: DAILY
PAIN SEVERITY GOAL: 0/10
DENIES PAIN: 1
LOWEST PAIN SEVERITY IN PAST 24 HOURS: 0/10
BOWEL INCONTINENCE: 1
APPETITE LEVEL: GOOD
PERSON REPORTING PAIN: PATIENT

## 2025-01-05 ASSESSMENT — ACTIVITIES OF DAILY LIVING (ADL)
CURRENT_FUNCTION: ONE PERSON
ENTERING_EXITING_HOME: MODERATE ASSIST
AMBULATION ASSISTANCE: NON-AMBULATORY
OASIS_M1830: 03

## 2025-01-06 ENCOUNTER — HOME CARE VISIT (OUTPATIENT)
Dept: HOME HEALTH SERVICES | Facility: HOME HEALTH | Age: 30
End: 2025-01-06
Payer: COMMERCIAL

## 2025-01-09 ENCOUNTER — OFFICE VISIT (OUTPATIENT)
Dept: WOUND CARE | Facility: CLINIC | Age: 30
End: 2025-01-09
Payer: COMMERCIAL

## 2025-01-09 PROCEDURE — 11043 DBRDMT MUSC&/FSCA 1ST 20/<: CPT

## 2025-01-09 PROCEDURE — 97602 WOUND(S) CARE NON-SELECTIVE: CPT

## 2025-01-10 ENCOUNTER — HOME CARE VISIT (OUTPATIENT)
Dept: HOME HEALTH SERVICES | Facility: HOME HEALTH | Age: 30
End: 2025-01-10
Payer: COMMERCIAL

## 2025-01-10 PROCEDURE — G0299 HHS/HOSPICE OF RN EA 15 MIN: HCPCS

## 2025-01-13 ENCOUNTER — TELEPHONE (OUTPATIENT)
Dept: PRIMARY CARE | Facility: CLINIC | Age: 30
End: 2025-01-13
Payer: COMMERCIAL

## 2025-01-13 NOTE — TELEPHONE ENCOUNTER
Efrain from [facility] called, They received [facility] signing off on [facility], but for some reason the start date form was placed under a different doctor name. Efrain stated Oscar just needs to go in and sign off on the start date. Please advise. Thanks. JW

## 2025-01-14 ENCOUNTER — HOME CARE VISIT (OUTPATIENT)
Dept: HOME HEALTH SERVICES | Facility: HOME HEALTH | Age: 30
End: 2025-01-14
Payer: COMMERCIAL

## 2025-01-14 VITALS
HEART RATE: 100 BPM | RESPIRATION RATE: 20 BRPM | DIASTOLIC BLOOD PRESSURE: 96 MMHG | TEMPERATURE: 96.5 F | OXYGEN SATURATION: 96 % | SYSTOLIC BLOOD PRESSURE: 149 MMHG

## 2025-01-14 PROCEDURE — G0300 HHS/HOSPICE OF LPN EA 15 MIN: HCPCS

## 2025-01-14 PROCEDURE — 400014 HH F/U

## 2025-01-14 ASSESSMENT — ENCOUNTER SYMPTOMS
SORE THROAT: 1
DENIES PAIN: 1
APPETITE LEVEL: FAIR
CHANGE IN APPETITE: DECREASED
AGITATION: 1
MUSCLE WEAKNESS: 1

## 2025-01-14 ASSESSMENT — ACTIVITIES OF DAILY LIVING (ADL): AMBULATION ASSISTANCE: NON-AMBULATORY

## 2025-01-15 NOTE — TELEPHONE ENCOUNTER
Per [facility] Plan of care scanned into EMR, phone number to call back is [phone]. Please call [facility] and let them know provider message. Thanks. JW

## 2025-01-15 NOTE — TELEPHONE ENCOUNTER
Attempted to call, was transferred several times and then line was just ringing for 4 minutes. Hung up    Will try again later.

## 2025-01-16 NOTE — TELEPHONE ENCOUNTER
Called and spoke with Katerina at [facility]. Katerina states she will reach out to NP in wound care to see if they can sign off on [facility] orders. Thanks, CG

## 2025-01-17 ENCOUNTER — TELEPHONE (OUTPATIENT)
Dept: PRIMARY CARE | Facility: CLINIC | Age: 30
End: 2025-01-17
Payer: COMMERCIAL

## 2025-01-20 ASSESSMENT — ENCOUNTER SYMPTOMS
HIGHEST PAIN SEVERITY IN PAST 24 HOURS: 0/10
LAST BOWEL MOVEMENT: 67214
LIMITED RANGE OF MOTION: 1
PAIN: PATIENT DENIES PAIN
DENIES PAIN: 1
AGITATION: 1
FATIGUES EASILY: 1
APPETITE LEVEL: GOOD
MUSCLE WEAKNESS: 1
PAIN SEVERITY GOAL: 0/10
SUBJECTIVE PAIN PROGRESSION: UNCHANGED
CHANGE IN APPETITE: UNCHANGED
LOWEST PAIN SEVERITY IN PAST 24 HOURS: 0/10
BOWEL INCONTINENCE: 1
PERSON REPORTING PAIN: PATIENT

## 2025-01-20 ASSESSMENT — ACTIVITIES OF DAILY LIVING (ADL)
AMBULATION ASSISTANCE: NON-AMBULATORY
CURRENT_FUNCTION: ONE PERSON
OASIS_M1830: 03
ENTERING_EXITING_HOME: MODERATE ASSIST

## 2025-01-20 NOTE — TELEPHONE ENCOUNTER
Called ph# - person who answered stated pt does not live there and he would not take msg....   
Vidal called back and stated he was his contact and I informed him of [facility] [place] [facility]  
Vidal called, Pt has an amputated R leg and needs new sleeve for his amputated leg. Vidal is not sure exact name of sleeve, Does think it needs to be a compression sleeve. They would get this from [employer] but they need an order for this. Is this something Oscar could order? If not who could do this for pt? Would he need referred out? Please advise. Thanks. JW  
Pt reports symptomatic improvement. Workup neg. Pt tolerating po. No emergent concerns at this time. Pt stable for DC home. Pt verbalizes agreement and understanding of plan to f/u with PCP and ED return precautions.

## 2025-01-23 ENCOUNTER — APPOINTMENT (OUTPATIENT)
Dept: WOUND CARE | Facility: CLINIC | Age: 30
End: 2025-01-23
Payer: COMMERCIAL

## 2025-01-24 ENCOUNTER — HOME CARE VISIT (OUTPATIENT)
Dept: HOME HEALTH SERVICES | Facility: HOME HEALTH | Age: 30
End: 2025-01-24
Payer: COMMERCIAL

## 2025-01-24 VITALS
RESPIRATION RATE: 12 BRPM | HEART RATE: 76 BPM | SYSTOLIC BLOOD PRESSURE: 114 MMHG | TEMPERATURE: 97.2 F | OXYGEN SATURATION: 97 % | DIASTOLIC BLOOD PRESSURE: 66 MMHG

## 2025-01-24 PROCEDURE — G0299 HHS/HOSPICE OF RN EA 15 MIN: HCPCS

## 2025-01-24 SDOH — ECONOMIC STABILITY: GENERAL

## 2025-01-24 ASSESSMENT — ENCOUNTER SYMPTOMS
CHANGE IN APPETITE: UNCHANGED
APPETITE LEVEL: GOOD
DENIES PAIN: 1
BOWEL INCONTINENCE: 1
PAIN SEVERITY GOAL: 0/10
PERSON REPORTING PAIN: PATIENT
FATIGUE: 1
HIGHEST PAIN SEVERITY IN PAST 24 HOURS: 0/10
LOWEST PAIN SEVERITY IN PAST 24 HOURS: 0/10
STOOL FREQUENCY: LESS THAN DAILY
MUSCLE WEAKNESS: 1
LIMITED RANGE OF MOTION: 1
SUBJECTIVE PAIN PROGRESSION: UNCHANGED
AGITATION: 1
FATIGUES EASILY: 1

## 2025-01-24 ASSESSMENT — ACTIVITIES OF DAILY LIVING (ADL)
MONEY MANAGEMENT (EXPENSES/BILLS): NEEDS ASSISTANCE
AMBULATION ASSISTANCE: NON-AMBULATORY
CURRENT_FUNCTION: ONE PERSON

## 2025-01-30 ENCOUNTER — APPOINTMENT (OUTPATIENT)
Dept: WOUND CARE | Facility: CLINIC | Age: 30
End: 2025-01-30
Payer: COMMERCIAL

## 2025-01-30 ENCOUNTER — OFFICE VISIT (OUTPATIENT)
Dept: INFECTIOUS DISEASES | Facility: HOSPITAL | Age: 30
End: 2025-01-30
Payer: COMMERCIAL

## 2025-01-30 VITALS
RESPIRATION RATE: 16 BRPM | HEART RATE: 70 BPM | SYSTOLIC BLOOD PRESSURE: 146 MMHG | OXYGEN SATURATION: 97 % | DIASTOLIC BLOOD PRESSURE: 89 MMHG

## 2025-01-30 DIAGNOSIS — M86.9 OSTEOMYELITIS, UNSPECIFIED SITE, UNSPECIFIED TYPE (MULTI): Primary | ICD-10-CM

## 2025-01-30 ASSESSMENT — ENCOUNTER SYMPTOMS
DEPRESSION: 0
LOSS OF SENSATION IN FEET: 1
OCCASIONAL FEELINGS OF UNSTEADINESS: 1

## 2025-01-30 ASSESSMENT — PATIENT HEALTH QUESTIONNAIRE - PHQ9
1. LITTLE INTEREST OR PLEASURE IN DOING THINGS: NOT AT ALL
SUM OF ALL RESPONSES TO PHQ9 QUESTIONS 1 AND 2: 0
2. FEELING DOWN, DEPRESSED OR HOPELESS: NOT AT ALL

## 2025-01-30 ASSESSMENT — COLUMBIA-SUICIDE SEVERITY RATING SCALE - C-SSRS
2. HAVE YOU ACTUALLY HAD ANY THOUGHTS OF KILLING YOURSELF?: NO
6. HAVE YOU EVER DONE ANYTHING, STARTED TO DO ANYTHING, OR PREPARED TO DO ANYTHING TO END YOUR LIFE?: NO
1. IN THE PAST MONTH, HAVE YOU WISHED YOU WERE DEAD OR WISHED YOU COULD GO TO SLEEP AND NOT WAKE UP?: NO

## 2025-01-30 NOTE — LETTER
01/30/25    Dipesh Tellez PA-C  [address]  [address]  [facility] [address]      Dear Dr. Dipesh Tellez PA-C,    I am writing to confirm that your patient, Alexis Ruiz, received care in my office on 01/30/25. I have enclosed a summary of the care provided to Alexis for your reference.    Please contact me with any questions you may have regarding the visit.    Sincerely,         Emanuel Tapia MD  [address]  [facility] [address]  [phone]    CC: No Recipients

## 2025-01-30 NOTE — PROGRESS NOTES
Infectious Diseases Clinic Follow Up Note:        Follow Up Reason: L ischial tuberosity OM      Assessment/Plan:     #L ischial tuberosity OM  Chronic wound likely decubitus ulcer.  Wound overall does not look infected.  Wound cultures did not show any Pseudomonas or MRSA.  Patient denies any long-term use of antibiotics in the past to treat osteomyelitis. Appropriate wound care is also required along with antibiotics for adequate response.      Spina bifida        Recommendations:     -Cont Augmentin 875 every 12 hours and Doxy 100 mg every 12 hours for 6 weeks  -Patient doesn't need IV abx as oral abx have equal efficacy as IV  -F/U PRN  -Close follow up with wound care      Emanuel Tapia MD  Date of service: 1/30/2025  Time of service: 2:54 PM      History Of Present Illness  Alexis Ruiz is a 29 y.o. male 29 y.o. male presenting with with PMHx s/f spina bifida, neurogenic bladder with chronic anaya, hypothyroid, ADHD, GERD, asthma, bipolar presenting with left gluteal wound.  Patient was seen in the hospital, dx with L ischial tuberosity OM. Wound cx did not grow MRSA or pseudomonas and plan was made to treat with 6 weeks of augmentin and doxy. Patient has been non complaint with meds and is taking abx intermittently.         Medications  Home medications:  (Not in a hospital admission)        Review of Systems     Constitutional:  Denies appetite change, chills, fatigue, fever.  HENT:  Denies ear discharge, sore throat    Eyes:  Denies photophobia, eye drainage  Respiratory:  Denies cough, chest tightness, SOB  Cardiovascular:  Denies chest pain, palpitations  Gastrointestinal:  Denies abdominal pain, diarrhea, nausea, vomiting.   Genitourinary:  anaya catheter  Skin:  Reports L ischial ulcer  Neurological:  Denies light-headedness, numbness and headaches.    Objective  Range of Vitals (last 24 hours)  [unfilled]  Daily Weight  12/04/24 : 90.7 kg (200 lb)    There is no height or weight on file to calculate  BMI.     Physical Exam  There were no vitals taken for this visit.  @TMAX(24)@    General: alert, oriented, NAD  HEENT: No conjunctival pallor, no scleral icterus  Neck: No LAD, No JVD  Abdomen: soft, non tender, non distended, BS+  Neuro: AAO x 3, PERRL, paraplegic  Skin: deferred by patient to eval wound  MSK: No joint inflammation     Relevant Results    Labs              CrCl cannot be calculated (Patient's most recent lab result is older than the maximum 7 days allowed.).  C-Reactive Protein   Date Value Ref Range Status   12/04/2024 18.15 (H) <1.00 mg/dL Final     Sedimentation Rate   Date Value Ref Range Status   12/04/2024 83 (H) 0 - 15 mm/h Final     HIV 1 and 2 Screen   Date Value Ref Range Status   04/27/2022 NONREACTIVE NONREACTIVE Final     Comment:      HIV Ag/Ab screen is performed using the Siemens [employer]   HIV Ag/Ab Combo assay which detects the presence of HIV    p24 antigen as well as antibodies to HIV-1   (Group M and O) and HIV-2.  .  No laboratory evidence of HIV infection. If acute HIV infection is   suspected, consider testing for HIV RNA by PCR (viral load).       Hepatitis C Ab   Date Value Ref Range Status   04/27/2022 NONREACTIVE NONREACTIVE Final     Comment:      Results from patients taking biotin supplements or receiving   high-dose biotin therapy should be interpreted with caution   due to possible interference with this test. Providers may    contact their local laboratory for further information.         Microbiology  No results found for the last 14 days.      Imaging  No results found.

## 2025-01-31 ENCOUNTER — HOME CARE VISIT (OUTPATIENT)
Dept: HOME HEALTH SERVICES | Facility: HOME HEALTH | Age: 30
End: 2025-01-31
Payer: COMMERCIAL

## 2025-01-31 PROCEDURE — G0299 HHS/HOSPICE OF RN EA 15 MIN: HCPCS

## 2025-02-06 ENCOUNTER — OFFICE VISIT (OUTPATIENT)
Dept: WOUND CARE | Facility: CLINIC | Age: 30
End: 2025-02-06
Payer: COMMERCIAL

## 2025-02-06 PROCEDURE — 11043 DBRDMT MUSC&/FSCA 1ST 20/<: CPT

## 2025-02-08 ENCOUNTER — HOME CARE VISIT (OUTPATIENT)
Dept: HOME HEALTH SERVICES | Facility: HOME HEALTH | Age: 30
End: 2025-02-08
Payer: COMMERCIAL

## 2025-02-08 VITALS
TEMPERATURE: 97.9 F | OXYGEN SATURATION: 98 % | RESPIRATION RATE: 12 BRPM | HEART RATE: 80 BPM | DIASTOLIC BLOOD PRESSURE: 64 MMHG | SYSTOLIC BLOOD PRESSURE: 126 MMHG

## 2025-02-08 SDOH — ECONOMIC STABILITY: GENERAL

## 2025-02-08 ASSESSMENT — ENCOUNTER SYMPTOMS
PAIN: PATIENT DENIES PAIN
FATIGUES EASILY: 1
PERSON REPORTING PAIN: PATIENT
SUBJECTIVE PAIN PROGRESSION: UNCHANGED
MUSCLE WEAKNESS: 1
CHANGE IN APPETITE: UNCHANGED
APPETITE LEVEL: GOOD
PAIN SEVERITY GOAL: 0/10
BOWEL INCONTINENCE: 1
DENIES PAIN: 1
HIGHEST PAIN SEVERITY IN PAST 24 HOURS: 0/10
AGITATION: 1
LOWEST PAIN SEVERITY IN PAST 24 HOURS: 0/10

## 2025-02-08 ASSESSMENT — ACTIVITIES OF DAILY LIVING (ADL)
AMBULATION ASSISTANCE: NON-AMBULATORY
CURRENT_FUNCTION: ONE PERSON
MONEY MANAGEMENT (EXPENSES/BILLS): NEEDS ASSISTANCE

## 2025-02-09 ENCOUNTER — HOME CARE VISIT (OUTPATIENT)
Dept: HOME HEALTH SERVICES | Facility: HOME HEALTH | Age: 30
End: 2025-02-09
Payer: COMMERCIAL

## 2025-02-09 PROCEDURE — G0299 HHS/HOSPICE OF RN EA 15 MIN: HCPCS

## 2025-02-10 PROCEDURE — RXMED WILLOW AMBULATORY MEDICATION CHARGE

## 2025-02-11 ENCOUNTER — PHARMACY VISIT (OUTPATIENT)
Dept: PHARMACY | Facility: CLINIC | Age: 30
End: 2025-02-11
Payer: COMMERCIAL

## 2025-02-12 VITALS
SYSTOLIC BLOOD PRESSURE: 124 MMHG | OXYGEN SATURATION: 98 % | TEMPERATURE: 97.5 F | RESPIRATION RATE: 12 BRPM | HEART RATE: 74 BPM | DIASTOLIC BLOOD PRESSURE: 68 MMHG

## 2025-02-12 SDOH — ECONOMIC STABILITY: GENERAL

## 2025-02-12 ASSESSMENT — ENCOUNTER SYMPTOMS
PERSON REPORTING PAIN: PATIENT
BOWEL INCONTINENCE: 1
FATIGUE: 1
LOWEST PAIN SEVERITY IN PAST 24 HOURS: 0/10
FATIGUES EASILY: 1
PAIN: PATIENT DENIES PAIN
HIGHEST PAIN SEVERITY IN PAST 24 HOURS: 0/10
PAIN SEVERITY GOAL: 0/10
DENIES PAIN: 1
STOOL FREQUENCY: DAILY
APPETITE LEVEL: GOOD
CHANGE IN APPETITE: UNCHANGED
SUBJECTIVE PAIN PROGRESSION: UNCHANGED

## 2025-02-12 ASSESSMENT — ACTIVITIES OF DAILY LIVING (ADL)
MONEY MANAGEMENT (EXPENSES/BILLS): NEEDS ASSISTANCE
CURRENT_FUNCTION: STAND BY ASSIST
AMBULATION ASSISTANCE: NON-AMBULATORY
CURRENT_FUNCTION: ONE PERSON

## 2025-02-13 ENCOUNTER — OFFICE VISIT (OUTPATIENT)
Dept: WOUND CARE | Facility: CLINIC | Age: 30
End: 2025-02-13
Payer: COMMERCIAL

## 2025-02-13 ENCOUNTER — APPOINTMENT (OUTPATIENT)
Dept: RADIOLOGY | Facility: HOSPITAL | Age: 30
End: 2025-02-13
Payer: COMMERCIAL

## 2025-02-13 ENCOUNTER — HOSPITAL ENCOUNTER (EMERGENCY)
Facility: HOSPITAL | Age: 30
Discharge: HOME | End: 2025-02-14
Attending: STUDENT IN AN ORGANIZED HEALTH CARE EDUCATION/TRAINING PROGRAM
Payer: COMMERCIAL

## 2025-02-13 DIAGNOSIS — L03.317 ABSCESS AND CELLULITIS OF GLUTEAL REGION: Primary | ICD-10-CM

## 2025-02-13 DIAGNOSIS — L02.31 ABSCESS AND CELLULITIS OF GLUTEAL REGION: Primary | ICD-10-CM

## 2025-02-13 LAB
ALBUMIN SERPL BCP-MCNC: 3.6 G/DL (ref 3.4–5)
ALP SERPL-CCNC: 57 U/L (ref 33–120)
ALT SERPL W P-5'-P-CCNC: 5 U/L (ref 10–52)
ANION GAP SERPL CALC-SCNC: 11 MMOL/L (ref 10–20)
AST SERPL W P-5'-P-CCNC: 7 U/L (ref 9–39)
BASOPHILS # BLD AUTO: 0.11 X10*3/UL (ref 0–0.1)
BASOPHILS NFR BLD AUTO: 0.8 %
BILIRUB SERPL-MCNC: 0.2 MG/DL (ref 0–1.2)
BUN SERPL-MCNC: 11 MG/DL (ref 6–23)
CALCIUM SERPL-MCNC: 9 MG/DL (ref 8.6–10.3)
CHLORIDE SERPL-SCNC: 104 MMOL/L (ref 98–107)
CO2 SERPL-SCNC: 28 MMOL/L (ref 21–32)
CREAT SERPL-MCNC: 0.65 MG/DL (ref 0.5–1.3)
CRP SERPL-MCNC: 3.9 MG/DL
EGFRCR SERPLBLD CKD-EPI 2021: >90 ML/MIN/1.73M*2
EOSINOPHIL # BLD AUTO: 0.29 X10*3/UL (ref 0–0.7)
EOSINOPHIL NFR BLD AUTO: 2.1 %
ERYTHROCYTE [DISTWIDTH] IN BLOOD BY AUTOMATED COUNT: 16.2 % (ref 11.5–14.5)
ERYTHROCYTE [SEDIMENTATION RATE] IN BLOOD BY WESTERGREN METHOD: 59 MM/H (ref 0–15)
GLUCOSE SERPL-MCNC: 70 MG/DL (ref 74–99)
HCT VFR BLD AUTO: 34.2 % (ref 41–52)
HGB BLD-MCNC: 10.7 G/DL (ref 13.5–17.5)
IMM GRANULOCYTES # BLD AUTO: 0.05 X10*3/UL (ref 0–0.7)
IMM GRANULOCYTES NFR BLD AUTO: 0.4 % (ref 0–0.9)
LACTATE SERPL-SCNC: 1 MMOL/L (ref 0.4–2)
LYMPHOCYTES # BLD AUTO: 2.51 X10*3/UL (ref 1.2–4.8)
LYMPHOCYTES NFR BLD AUTO: 17.8 %
MCH RBC QN AUTO: 22.4 PG (ref 26–34)
MCHC RBC AUTO-ENTMCNC: 31.3 G/DL (ref 32–36)
MCV RBC AUTO: 72 FL (ref 80–100)
MONOCYTES # BLD AUTO: 1.2 X10*3/UL (ref 0.1–1)
MONOCYTES NFR BLD AUTO: 8.5 %
NEUTROPHILS # BLD AUTO: 9.95 X10*3/UL (ref 1.2–7.7)
NEUTROPHILS NFR BLD AUTO: 70.4 %
NRBC BLD-RTO: 0 /100 WBCS (ref 0–0)
PLATELET # BLD AUTO: 499 X10*3/UL (ref 150–450)
POTASSIUM SERPL-SCNC: 3.8 MMOL/L (ref 3.5–5.3)
PROT SERPL-MCNC: 7 G/DL (ref 6.4–8.2)
RBC # BLD AUTO: 4.77 X10*6/UL (ref 4.5–5.9)
SODIUM SERPL-SCNC: 139 MMOL/L (ref 136–145)
WBC # BLD AUTO: 14.1 X10*3/UL (ref 4.4–11.3)

## 2025-02-13 PROCEDURE — 2500000004 HC RX 250 GENERAL PHARMACY W/ HCPCS (ALT 636 FOR OP/ED)

## 2025-02-13 PROCEDURE — 2500000004 HC RX 250 GENERAL PHARMACY W/ HCPCS (ALT 636 FOR OP/ED): Performed by: STUDENT IN AN ORGANIZED HEALTH CARE EDUCATION/TRAINING PROGRAM

## 2025-02-13 PROCEDURE — 86140 C-REACTIVE PROTEIN: CPT

## 2025-02-13 PROCEDURE — 96375 TX/PRO/DX INJ NEW DRUG ADDON: CPT | Mod: 59

## 2025-02-13 PROCEDURE — 36415 COLL VENOUS BLD VENIPUNCTURE: CPT | Performed by: STUDENT IN AN ORGANIZED HEALTH CARE EDUCATION/TRAINING PROGRAM

## 2025-02-13 PROCEDURE — 85025 COMPLETE CBC W/AUTO DIFF WBC: CPT

## 2025-02-13 PROCEDURE — 83605 ASSAY OF LACTIC ACID: CPT | Performed by: STUDENT IN AN ORGANIZED HEALTH CARE EDUCATION/TRAINING PROGRAM

## 2025-02-13 PROCEDURE — 96367 TX/PROPH/DG ADDL SEQ IV INF: CPT

## 2025-02-13 PROCEDURE — 2500000005 HC RX 250 GENERAL PHARMACY W/O HCPCS

## 2025-02-13 PROCEDURE — 96365 THER/PROPH/DIAG IV INF INIT: CPT | Mod: 59

## 2025-02-13 PROCEDURE — 36415 COLL VENOUS BLD VENIPUNCTURE: CPT

## 2025-02-13 PROCEDURE — 87040 BLOOD CULTURE FOR BACTERIA: CPT | Mod: PORLAB

## 2025-02-13 PROCEDURE — 87077 CULTURE AEROBIC IDENTIFY: CPT | Mod: PORLAB

## 2025-02-13 PROCEDURE — 96361 HYDRATE IV INFUSION ADD-ON: CPT

## 2025-02-13 PROCEDURE — 2550000001 HC RX 255 CONTRASTS

## 2025-02-13 PROCEDURE — 80053 COMPREHEN METABOLIC PANEL: CPT

## 2025-02-13 PROCEDURE — 96366 THER/PROPH/DIAG IV INF ADDON: CPT

## 2025-02-13 PROCEDURE — 85652 RBC SED RATE AUTOMATED: CPT

## 2025-02-13 PROCEDURE — 99213 OFFICE O/P EST LOW 20 MIN: CPT | Mod: 25

## 2025-02-13 PROCEDURE — 72193 CT PELVIS W/DYE: CPT

## 2025-02-13 PROCEDURE — 96368 THER/DIAG CONCURRENT INF: CPT

## 2025-02-13 PROCEDURE — 72193 CT PELVIS W/DYE: CPT | Mod: FOREIGN READ | Performed by: RADIOLOGY

## 2025-02-13 PROCEDURE — 99285 EMERGENCY DEPT VISIT HI MDM: CPT | Performed by: STUDENT IN AN ORGANIZED HEALTH CARE EDUCATION/TRAINING PROGRAM

## 2025-02-13 RX ORDER — VANCOMYCIN HYDROCHLORIDE 1 G/20ML
INJECTION, POWDER, LYOPHILIZED, FOR SOLUTION INTRAVENOUS DAILY PRN
Status: DISCONTINUED | OUTPATIENT
Start: 2025-02-13 | End: 2025-02-14 | Stop reason: HOSPADM

## 2025-02-13 RX ORDER — DIPHENHYDRAMINE HYDROCHLORIDE 50 MG/ML
25 INJECTION INTRAMUSCULAR; INTRAVENOUS ONCE
Status: COMPLETED | OUTPATIENT
Start: 2025-02-13 | End: 2025-02-13

## 2025-02-13 RX ORDER — METRONIDAZOLE 500 MG/100ML
500 INJECTION, SOLUTION INTRAVENOUS ONCE
Status: COMPLETED | OUTPATIENT
Start: 2025-02-13 | End: 2025-02-13

## 2025-02-13 RX ORDER — DIPHENHYDRAMINE HYDROCHLORIDE 50 MG/ML
INJECTION INTRAMUSCULAR; INTRAVENOUS
Status: COMPLETED
Start: 2025-02-13 | End: 2025-02-13

## 2025-02-13 RX ADMIN — DIPHENHYDRAMINE HYDROCHLORIDE 25 MG: 50 INJECTION INTRAMUSCULAR; INTRAVENOUS at 22:07

## 2025-02-13 RX ADMIN — IOHEXOL 75 ML: 350 INJECTION, SOLUTION INTRAVENOUS at 20:14

## 2025-02-13 RX ADMIN — PIPERACILLIN SODIUM AND TAZOBACTAM SODIUM 4.5 G: 4; .5 INJECTION, SOLUTION INTRAVENOUS at 19:25

## 2025-02-13 RX ADMIN — SODIUM CHLORIDE 1000 ML: 9 INJECTION, SOLUTION INTRAVENOUS at 18:23

## 2025-02-13 RX ADMIN — METRONIDAZOLE 500 MG: 500 INJECTION, SOLUTION INTRAVENOUS at 21:12

## 2025-02-13 RX ADMIN — VANCOMYCIN HYDROCHLORIDE 2000 MG: 10 INJECTION, POWDER, LYOPHILIZED, FOR SOLUTION INTRAVENOUS at 20:38

## 2025-02-13 ASSESSMENT — PAIN SCALES - GENERAL: PAINLEVEL_OUTOF10: 5 - MODERATE PAIN

## 2025-02-13 ASSESSMENT — PAIN DESCRIPTION - PAIN TYPE: TYPE: ACUTE PAIN;CHRONIC PAIN

## 2025-02-13 ASSESSMENT — PAIN DESCRIPTION - LOCATION: LOCATION: BUTTOCKS

## 2025-02-13 ASSESSMENT — COLUMBIA-SUICIDE SEVERITY RATING SCALE - C-SSRS
1. IN THE PAST MONTH, HAVE YOU WISHED YOU WERE DEAD OR WISHED YOU COULD GO TO SLEEP AND NOT WAKE UP?: NO
2. HAVE YOU ACTUALLY HAD ANY THOUGHTS OF KILLING YOURSELF?: NO
6. HAVE YOU EVER DONE ANYTHING, STARTED TO DO ANYTHING, OR PREPARED TO DO ANYTHING TO END YOUR LIFE?: NO

## 2025-02-13 ASSESSMENT — PAIN - FUNCTIONAL ASSESSMENT: PAIN_FUNCTIONAL_ASSESSMENT: 0-10

## 2025-02-13 NOTE — ED TRIAGE NOTES
Pt presents from wound clinic with concern for infected sacral wounds/ abscesses. Pt denies icreased pain or discomfort, denies fever chills or other symptoms

## 2025-02-13 NOTE — ED PROVIDER NOTES
Chief Complaint   Patient presents with    Wound Check       29-year-old male arrives to the emergency department with a chief complaint of a perirectal abscess.  The patient has a medical history of spina bifida as well as a BKA of the right lower.  The patient has a known long-term left sided wound in the gluteal fold which she sees at home wound care for, it arrives packed.  The father who accompanies the patient states that while assisting in cleaning the patient a new abscess has been appreciated over the last week that is superior and to the left of the rectum.  The patient denies any fevers or chills, the patient does endorse bloody drainage in his brief and a pain level of 5 out of 10.  Patient states that with his medical history he does not feel very much pain in his rectal area.  The patient does have a neurogenic bladder and has an indwelling Gutierrez catheter however states that he has bowel movements normally.  Patient denies any other symptoms or complaints      History provided by:  Patient   used: No         PmHx, PsHx, Allergies, Family Hx, social Hx reviewed as documented    A complete 10 point review of systems was performed and is negative except for as mentioned in the HPI.    Physical Exam:    General: Patient is AAOx3, appears well developed, well nourished, is a good historian, answers questions appropriately    HEENT: head normocephalic, atraumatic, PERRLA, EOMs intact, oropharynx without erythema or exudate, buccal mucosa intact without lesions, multiple dental caries and erosions, TMs unremarkable, nose is patent bilateral    Neck: supple, full ROM, negative for lymphadenopathy, JVD, thyromegaly, tracheal deviation, nuccal rigidity    Pulmonary: CTAB, no accessory muscle use, able to speak full clear sentences    Cardiac: HRRR, no murmurs, rubs or gallops    GI: soft, non-tender, non-distended, BS + x 4, no masses or organomegaly, no guarding or CVA tenderness noted,  negative pompa's, mcburney's    Musculoskeletal: Right BKA, full weight bearing, MULLINS, no joint effusions, clubbing or edema noted    Skin: Chronic wound that is packed from wound care in left gluteal fold, new area of inflammation, redness, ulcerations appreciated superior to the rectum, drainage appreciated.  otherwise patient skin intact, no lesions or rashes noted, turgor is good.    Neuro: patient follow commands, cranial nerves 2-12 grossly intact, motor strengths 5/5 upper and lower extremities, DTR's and sensation are symmetrical. No focal deficits.    Rectal/: No urinary burning, urgency, change in frequency.  Patient has no rectal complaints        Medical Decision Making  This patient was seen, treated, and evaluated in conjunction with Dr. Corie Dooley    Primary consideration for this patient would be a deep tissue abscess, osteomyelitis, or other  pathology related to what appears to be a perirectal abscess.  The patient denies any fevers or chills, patient mildly tachycardic upon arrival with a history of a mildly fast heart rate.  Patient denies any chest pain, shortness of breath or other indicative factors of ACS.  Diagnostic blood work will be used to further evaluate, wound culture collected on initial assessment, CT pelvis with IV contrast will be used to further evaluate.  Patient given 1 L of normal saline    The patient's CT findings show chronic findings for the patient including osteomyelitis which the patient is aware of in the left gluteal fold, as well as a second wound that was also chronic for the patient extending down the gluteal cleft.  No apparent acute perirectal deep tissue abscess is appreciated.  After speaking with the attending physician, she is concerned that the osteomyelitis has worsened and could be considered an acute osteomyelitis at this time.  Patient given vancomycin, Zosyn, Flagyl and recommended hospitalization for further evaluation and treatment.           ED Course as of 02/14/25 0039   Thu Feb 13, 2025   1920 Although the patient's heart rate is at a chronically elevated just slightly over 100 after chart review, with leukocytosis of 14.1, sepsis alert called at 1855.  Blood cultures x 2, vancomycin IV, Zosyn IV to be given here in the emergency department.  Lactate will also be used to further evaluate.  Patient's CRP found to be elevated at 3.90 as well as elevated sed rate at 59. [JM]   2202 I was called to bedside.  The patient has diffuse itching.  He does not have any skin erythema.  He does not have any other associated symptoms.  Lungs are clear.  He has been on vancomycin for approximately an hour and 15 minutes, Flagyl for approximately 50 minutes.  He already completed Zosyn and approximately 90 mins ago.  He has had vancomycin in the past without reaction.  He has also had Zosyn previously without reaction.  In chart review I do not see where he has ever received Flagyl previously.  As such I feel this is most likely the culprit.  While both medications were stopped initially the vancomycin was restarted after the patient received a dose of Benadryl. [SP]   2223 I discussed results with the patient. He has what is concerning for acute osteomyelitis of the left ischial tuberosity. In discussion with the patient and his father, it seems as this is known. Review of recent MRI of the pelvis confirms this is the same area that is known.  [SP]      ED Course User Index  [JM] Alton Rodriguez, APRN-CNP  [SP] Delma Meng DO         Diagnoses as of 02/14/25 0039   Abscess and cellulitis of gluteal region       The patient has had the following imaging during this ER visit: CT PELVIS W IV CONTRAST  PHARMACY TO DOSE VANCO     Patient History   Past Medical History:   Diagnosis Date    Allergic     Anxiety     Bipolar affective disorder (Multi)     Neurogenic bladder     Spina bifida      Past Surgical History:   Procedure Laterality Date    LEG AMPUTATION THROUGH  LOWER TIBIA AND FIBULA Right 04/2022    Dr Guillaume    OTHER SURGICAL HISTORY  12/26/2019    Foot surgery    OTHER SURGICAL HISTORY  12/26/2019    Eye surgery    OTHER SURGICAL HISTORY  12/26/2019    Hip surgery    OTHER SURGICAL HISTORY  12/26/2019    Abdominal surgery     Family History   Adopted: Yes     Social History     Tobacco Use    Smoking status: Every Day     Current packs/day: 1.00     Types: Cigarettes    Smokeless tobacco: Never   Vaping Use    Vaping status: Every Day    Substances: Nicotine, Flavoring   Substance Use Topics    Alcohol use: Not Currently     Comment: 1-2 drinks per month    Drug use: Yes     Types: Marijuana       ED Triage Vitals [02/13/25 1654]   Temperature Heart Rate Respirations BP   36 °C (96.8 °F) (!) 108 16 141/83      Pulse Ox Temp Source Heart Rate Source Patient Position   97 % Temporal -- Sitting      BP Location FiO2 (%)     Left arm --       Vitals:    02/13/25 2100 02/13/25 2204 02/13/25 2300 02/13/25 2359   BP: 110/73 121/74 119/67 110/72   BP Location:  Right arm     Patient Position:  Sitting     Pulse: 98 (!) 116 (!) 103 (!) 108   Resp: 16 20 18 18   Temp:       TempSrc:       SpO2: 99% 100% 100% 98%   Weight:       Height:                   ODESSA Lanier-KRIS  02/14/25 0039

## 2025-02-14 ENCOUNTER — HOME CARE VISIT (OUTPATIENT)
Dept: HOME HEALTH SERVICES | Facility: HOME HEALTH | Age: 30
End: 2025-02-14
Payer: COMMERCIAL

## 2025-02-14 VITALS
OXYGEN SATURATION: 98 % | DIASTOLIC BLOOD PRESSURE: 72 MMHG | RESPIRATION RATE: 18 BRPM | WEIGHT: 230 LBS | BODY MASS INDEX: 31.15 KG/M2 | SYSTOLIC BLOOD PRESSURE: 110 MMHG | HEART RATE: 108 BPM | HEIGHT: 72 IN | TEMPERATURE: 96.8 F

## 2025-02-14 NOTE — PROGRESS NOTES
Emergency Medicine Transition of Care Note.    I received Alexis Ruiz in signout from  ***.  Please see the previous ED provider note for all HPI, PE and MDM up to the time of signout at ***. This is in addition to the primary record.    In brief Alexis Ruiz is an 29 y.o. male presenting for   Chief Complaint   Patient presents with    Wound Check     At the time of signout we were awaiting: ***    ED Course as of 02/14/25 0008   Thu Feb 13, 2025 1920 Although the patient's heart rate is at a chronically elevated just slightly over 100 after chart review, with leukocytosis of 14.1, sepsis alert called at 1855.  Blood cultures x 2, vancomycin IV, Zosyn IV to be given here in the emergency department.  Lactate will also be used to further evaluate.  Patient's CRP found to be elevated at 3.90 as well as elevated sed rate at 59. [JM]   2202 I was called to bedside.  The patient has diffuse itching.  He does not have any skin erythema.  He does not have any other associated symptoms.  Lungs are clear.  He has been on vancomycin for approximately an hour and 15 minutes, Flagyl for approximately 50 minutes.  He already completed Zosyn and approximately 90 mins ago.  He has had vancomycin in the past without reaction.  He has also had Zosyn previously without reaction.  In chart review I do not see where he has ever received Flagyl previously.  As such I feel this is most likely the culprit.  While both medications were stopped initially the vancomycin was restarted after the patient received a dose of Benadryl. [SP]   2223 I discussed results with the patient. He has what is concerning for acute osteomyelitis of the left ischial tuberosity. In discussion with the patient and his father, it seems as this is known. Review of recent MRI of the pelvis confirms this is the same area that is known.  [SP]      ED Course User Index  [JM] Alton Rodriguez, APRN-CNP  [SP] Delma Meng DO         Diagnoses as of 02/14/25  0008   Abscess and cellulitis of gluteal region       Medical Decision Making      Final diagnoses:   [L02.31, L03.317] Abscess and cellulitis of gluteal region           Procedure  Procedures    Demla Meng DO

## 2025-02-16 LAB
B-LACTAMASE ORGANISM ISLT: POSITIVE
BACTERIA BLD CULT: NORMAL
BACTERIA BLD CULT: NORMAL
BACTERIA SPEC CULT: ABNORMAL
GRAM STN SPEC: ABNORMAL
GRAM STN SPEC: ABNORMAL

## 2025-02-17 LAB
B-LACTAMASE ORGANISM ISLT: POSITIVE
BACTERIA SPEC CULT: ABNORMAL
GRAM STN SPEC: ABNORMAL
GRAM STN SPEC: ABNORMAL

## 2025-02-18 ENCOUNTER — TELEPHONE (OUTPATIENT)
Dept: PHARMACY | Facility: HOSPITAL | Age: 30
End: 2025-02-18
Payer: COMMERCIAL

## 2025-02-18 DIAGNOSIS — T14.8XXA WOUND INFECTION: Primary | ICD-10-CM

## 2025-02-18 DIAGNOSIS — L08.9 WOUND INFECTION: Primary | ICD-10-CM

## 2025-02-18 LAB
BACTERIA BLD CULT: NORMAL
BACTERIA BLD CULT: NORMAL

## 2025-02-18 RX ORDER — LEVOFLOXACIN 750 MG/1
750 TABLET ORAL EVERY 24 HOURS
Qty: 5 TABLET | Refills: 0 | Status: SHIPPED | OUTPATIENT
Start: 2025-02-18 | End: 2025-02-23

## 2025-02-18 NOTE — PROGRESS NOTES
EDPD Note: Initiation     Contacted Alexis Ruiz regarding a positive wound culture/result that was taken during their recent emergency room visit. I completed education with  father, Vidal  . The patient is not being treated appropriately.    Patient presented to the ED for perirectal abscess, PMH spina bifida. Not discharged on new antibiotics, currently taking  Augmentin 875mg and doxycycline 100mg for chronic wound, prescribed at discharge 12/6/2024. Augmentin 875mg BID x30 last dispensed 2/11/25. Per patient's father, patient has a wound care follow up appointment scheduled for this Thursday. Counseled on new antibiotic, and advised that if patient develops rash or reaction to levofloxacin, may give benadryl with antibiotic, and if severe reaction, to return to the ED for treatment. Father expressed understanding, and is amenable to trying levofloxacin with patient history of ciprofloxacin allergy (hives)     The following prescription was sent to the patient's preferred pharmacy. No further follow up needed from EDPD Team.     Drug: Levofloxacin 750mg   Sig:  PO daily x5  Qty: 5  Days Supply: 5    Susceptibility data from last 90 days.  Collected Specimen Info Organism Amoxicillin/Clavulanate Ampicillin Ampicillin/Sulbactam Aztreonam Cefazolin Cefazolin (uncomplicated UTIs only) Cefepime Ceftazidime Ceftriaxone Ciprofloxacin Gentamicin Levofloxacin   02/13/25 Tissue/Biopsy from Wound/Tissue Pseudomonas aeruginosa     S    S  S   S   S     Mixed Anaerobic Bacteria                 Mixed Gram-Positive and Gram-Negative Bacteria               12/04/24 Urine from Clean Catch/Voided Proteus mirabilis  S  R  S   I  S    S  S  S    12/04/24 Tissue/Biopsy from Wound/Tissue Mixed Gram-Positive and Gram-Negative Bacteria                 Collected Specimen Info Organism Nitrofurantoin Piperacillin/Tazobactam Tetracycline Tobramycin Trimethoprim/Sulfamethoxazole   02/13/25 Tissue/Biopsy from Wound/Tissue Pseudomonas  aeruginosa   S   S      Mixed Anaerobic Bacteria          Mixed Gram-Positive and Gram-Negative Bacteria        12/04/24 Urine from Clean Catch/Voided Proteus mirabilis  R  S  R   S   12/04/24 Tissue/Biopsy from Wound/Tissue Mixed Gram-Positive and Gram-Negative Bacteria            If there are any other questions for the ED Post-Discharge Culture Follow Up Team, please contact [phone]. Fax: [phone].    Cindy Cobos, AlejandraD

## 2025-02-18 NOTE — PROGRESS NOTES
EDPD Note: Initiation     Contacted Alexis Ruiz regarding a positive wound culture/result that was taken during their recent emergency room visit. I completed education with  father, Vidal  . The patient is not being treated appropriately.    Patient presented to the ED for perirectal abscess, PMH spina bifida. Not discharged on new antibiotics, currently taking  Augmentin 875mg and doxycycline 100mg for chronic wound, prescribed at discharge 12/6/2024. Augmentin 875mg BID x30 last dispensed 2/11/25. Per patient's father, patient has a wound care follow up appointment scheduled for this Thursday. Counseled on new antibiotic, and advised that if patient develops rash or reaction to levofloxacin, may give benadryl with antibiotic, and if severe reaction, to return to the ED for treatment. Father expressed understanding, and is amenable to trying levofloxacin with patient history of ciprofloxacin allergy (hives)     The following prescription was sent to the patient's preferred pharmacy. No further follow up needed from EDPD Team.     Drug: Levofloxacin 750mg   Sig:  PO daily x5  Qty: 5  Days Supply: 5    Susceptibility data from last 90 days.  Collected Specimen Info Organism Amoxicillin/Clavulanate Ampicillin Ampicillin/Sulbactam Aztreonam Cefazolin Cefazolin (uncomplicated UTIs only) Cefepime Ceftazidime Ceftriaxone Ciprofloxacin Gentamicin Levofloxacin   02/13/25 Tissue/Biopsy from Wound/Tissue Pseudomonas aeruginosa     S    S  S   S   S     Mixed Anaerobic Bacteria                 Mixed Gram-Positive and Gram-Negative Bacteria               12/04/24 Urine from Clean Catch/Voided Proteus mirabilis  S  R  S   I  S    S  S  S    12/04/24 Tissue/Biopsy from Wound/Tissue Mixed Gram-Positive and Gram-Negative Bacteria                 Collected Specimen Info Organism Nitrofurantoin Piperacillin/Tazobactam Tetracycline Tobramycin Trimethoprim/Sulfamethoxazole   02/13/25 Tissue/Biopsy from Wound/Tissue Pseudomonas  aeruginosa   S   S      Mixed Anaerobic Bacteria          Mixed Gram-Positive and Gram-Negative Bacteria        12/04/24 Urine from Clean Catch/Voided Proteus mirabilis  R  S  R   S   12/04/24 Tissue/Biopsy from Wound/Tissue Mixed Gram-Positive and Gram-Negative Bacteria            If there are any other questions for the ED Post-Discharge Culture Follow Up Team, please contact [phone]. Fax: [phone].    Cindy Cobos, AlejandraD

## 2025-02-19 ENCOUNTER — HOME CARE VISIT (OUTPATIENT)
Dept: HOME HEALTH SERVICES | Facility: HOME HEALTH | Age: 30
End: 2025-02-19
Payer: COMMERCIAL

## 2025-02-20 ENCOUNTER — OFFICE VISIT (OUTPATIENT)
Dept: WOUND CARE | Facility: CLINIC | Age: 30
End: 2025-02-20
Payer: COMMERCIAL

## 2025-02-20 PROCEDURE — 11042 DBRDMT SUBQ TIS 1ST 20SQCM/<: CPT

## 2025-02-21 ENCOUNTER — HOME CARE VISIT (OUTPATIENT)
Dept: HOME HEALTH SERVICES | Facility: HOME HEALTH | Age: 30
End: 2025-02-21
Payer: COMMERCIAL

## 2025-02-27 ENCOUNTER — OFFICE VISIT (OUTPATIENT)
Dept: WOUND CARE | Facility: CLINIC | Age: 30
End: 2025-02-27
Payer: COMMERCIAL

## 2025-02-27 PROCEDURE — 11043 DBRDMT MUSC&/FSCA 1ST 20/<: CPT

## 2025-03-01 ENCOUNTER — HOME CARE VISIT (OUTPATIENT)
Dept: HOME HEALTH SERVICES | Facility: HOME HEALTH | Age: 30
End: 2025-03-01
Payer: COMMERCIAL

## 2025-03-01 PROCEDURE — G0299 HHS/HOSPICE OF RN EA 15 MIN: HCPCS

## 2025-03-02 VITALS
DIASTOLIC BLOOD PRESSURE: 70 MMHG | RESPIRATION RATE: 12 BRPM | OXYGEN SATURATION: 98 % | TEMPERATURE: 97.1 F | SYSTOLIC BLOOD PRESSURE: 126 MMHG | HEART RATE: 88 BPM

## 2025-03-02 SDOH — ECONOMIC STABILITY: GENERAL

## 2025-03-02 ASSESSMENT — ENCOUNTER SYMPTOMS
HIGHEST PAIN SEVERITY IN PAST 24 HOURS: 0/10
AGITATION: 1
LOWEST PAIN SEVERITY IN PAST 24 HOURS: 0/10
APPETITE LEVEL: GOOD
BOWEL INCONTINENCE: 1
DENIES PAIN: 1
LIMITED RANGE OF MOTION: 1
PERSON REPORTING PAIN: PATIENT
STOOL FREQUENCY: DAILY
LAST BOWEL MOVEMENT: 67265
PAIN: PATIENT DENIES PAIN
FATIGUES EASILY: 1
PAIN SEVERITY GOAL: 0/10
SUBJECTIVE PAIN PROGRESSION: UNCHANGED
CHANGE IN APPETITE: UNCHANGED
MUSCLE WEAKNESS: 1

## 2025-03-06 ENCOUNTER — APPOINTMENT (OUTPATIENT)
Dept: WOUND CARE | Facility: CLINIC | Age: 30
End: 2025-03-06
Payer: COMMERCIAL

## 2025-03-07 ENCOUNTER — HOME CARE VISIT (OUTPATIENT)
Dept: HOME HEALTH SERVICES | Facility: HOME HEALTH | Age: 30
End: 2025-03-07
Payer: COMMERCIAL

## 2025-03-07 PROCEDURE — G0299 HHS/HOSPICE OF RN EA 15 MIN: HCPCS

## 2025-03-08 VITALS
TEMPERATURE: 97.6 F | DIASTOLIC BLOOD PRESSURE: 60 MMHG | RESPIRATION RATE: 12 BRPM | HEART RATE: 96 BPM | SYSTOLIC BLOOD PRESSURE: 122 MMHG | OXYGEN SATURATION: 99 %

## 2025-03-08 SDOH — ECONOMIC STABILITY: GENERAL

## 2025-03-08 ASSESSMENT — ENCOUNTER SYMPTOMS
PAIN SEVERITY GOAL: 0/10
BOWEL INCONTINENCE: 1
DENIES PAIN: 1
CHANGE IN APPETITE: UNCHANGED
STOOL FREQUENCY: DAILY
HIGHEST PAIN SEVERITY IN PAST 24 HOURS: 0/10
LOWEST PAIN SEVERITY IN PAST 24 HOURS: 0/10
FATIGUES EASILY: 1
MUSCLE WEAKNESS: 1
APPETITE LEVEL: GOOD
AGITATION: 1
PERSON REPORTING PAIN: PATIENT
PAIN: PATIENT DENIES PAIN
SUBJECTIVE PAIN PROGRESSION: UNCHANGED

## 2025-03-08 ASSESSMENT — ACTIVITIES OF DAILY LIVING (ADL): MONEY MANAGEMENT (EXPENSES/BILLS): NEEDS ASSISTANCE

## 2025-03-10 ENCOUNTER — HOME CARE VISIT (OUTPATIENT)
Dept: HOME HEALTH SERVICES | Facility: HOME HEALTH | Age: 30
End: 2025-03-10
Payer: COMMERCIAL

## 2025-03-10 PROCEDURE — G0299 HHS/HOSPICE OF RN EA 15 MIN: HCPCS

## 2025-03-11 ENCOUNTER — OFFICE VISIT (OUTPATIENT)
Dept: WOUND CARE | Facility: CLINIC | Age: 30
End: 2025-03-11
Payer: COMMERCIAL

## 2025-03-11 VITALS
HEART RATE: 76 BPM | TEMPERATURE: 97 F | DIASTOLIC BLOOD PRESSURE: 64 MMHG | SYSTOLIC BLOOD PRESSURE: 122 MMHG | OXYGEN SATURATION: 98 % | RESPIRATION RATE: 12 BRPM

## 2025-03-11 PROCEDURE — 11043 DBRDMT MUSC&/FSCA 1ST 20/<: CPT

## 2025-03-11 ASSESSMENT — ENCOUNTER SYMPTOMS
DENIES PAIN: 1
PERSON REPORTING PAIN: PATIENT
HIGHEST PAIN SEVERITY IN PAST 24 HOURS: 0/10
PAIN: PATIENT DENIES PAIN
LOWEST PAIN SEVERITY IN PAST 24 HOURS: 0/10
PAIN SEVERITY GOAL: 0/10
SUBJECTIVE PAIN PROGRESSION: UNCHANGED

## 2025-03-11 ASSESSMENT — ACTIVITIES OF DAILY LIVING (ADL): ENTERING_EXITING_HOME: MODERATE ASSIST

## 2025-03-12 PROCEDURE — 400014 HH F/U

## 2025-03-14 ENCOUNTER — HOME CARE VISIT (OUTPATIENT)
Dept: HOME HEALTH SERVICES | Facility: HOME HEALTH | Age: 30
End: 2025-03-14
Payer: COMMERCIAL

## 2025-03-16 ASSESSMENT — ENCOUNTER SYMPTOMS
APPETITE LEVEL: GOOD
STOOL FREQUENCY: LESS THAN DAILY
BOWEL INCONTINENCE: 1
MUSCLE WEAKNESS: 1
CHANGE IN APPETITE: UNCHANGED
FATIGUES EASILY: 1

## 2025-03-16 ASSESSMENT — ACTIVITIES OF DAILY LIVING (ADL)
OASIS_M1830: 03
CURRENT_FUNCTION: STAND BY ASSIST
AMBULATION ASSISTANCE: STAND BY ASSIST

## 2025-03-18 ENCOUNTER — OFFICE VISIT (OUTPATIENT)
Dept: WOUND CARE | Facility: CLINIC | Age: 30
End: 2025-03-18
Payer: COMMERCIAL

## 2025-03-18 PROCEDURE — 11043 DBRDMT MUSC&/FSCA 1ST 20/<: CPT

## 2025-03-20 PROCEDURE — G0179 MD RECERTIFICATION HHA PT: HCPCS | Performed by: PHYSICIAN ASSISTANT

## 2025-03-22 ENCOUNTER — HOME CARE VISIT (OUTPATIENT)
Dept: HOME HEALTH SERVICES | Facility: HOME HEALTH | Age: 30
End: 2025-03-22
Payer: COMMERCIAL

## 2025-03-25 ENCOUNTER — OFFICE VISIT (OUTPATIENT)
Dept: WOUND CARE | Facility: CLINIC | Age: 30
End: 2025-03-25
Payer: COMMERCIAL

## 2025-03-25 PROCEDURE — 11043 DBRDMT MUSC&/FSCA 1ST 20/<: CPT

## 2025-03-28 ENCOUNTER — HOME CARE VISIT (OUTPATIENT)
Dept: HOME HEALTH SERVICES | Facility: HOME HEALTH | Age: 30
End: 2025-03-28
Payer: COMMERCIAL

## 2025-04-01 ENCOUNTER — APPOINTMENT (OUTPATIENT)
Dept: WOUND CARE | Facility: CLINIC | Age: 30
End: 2025-04-01
Payer: COMMERCIAL

## 2025-04-04 ENCOUNTER — HOME CARE VISIT (OUTPATIENT)
Dept: HOME HEALTH SERVICES | Facility: HOME HEALTH | Age: 30
End: 2025-04-04
Payer: COMMERCIAL

## 2025-04-04 PROCEDURE — G0299 HHS/HOSPICE OF RN EA 15 MIN: HCPCS

## 2025-04-05 SDOH — ECONOMIC STABILITY: GENERAL

## 2025-04-05 ASSESSMENT — ENCOUNTER SYMPTOMS
APPETITE LEVEL: GOOD
AGITATION: 1
FATIGUES EASILY: 1
CHANGE IN APPETITE: UNCHANGED
DENIES PAIN: 1
PERSON REPORTING PAIN: PATIENT

## 2025-04-05 ASSESSMENT — ACTIVITIES OF DAILY LIVING (ADL): MONEY MANAGEMENT (EXPENSES/BILLS): NEEDS ASSISTANCE

## 2025-04-08 ENCOUNTER — APPOINTMENT (OUTPATIENT)
Dept: WOUND CARE | Facility: CLINIC | Age: 30
End: 2025-04-08
Payer: COMMERCIAL

## 2025-04-10 ENCOUNTER — APPOINTMENT (OUTPATIENT)
Dept: WOUND CARE | Facility: CLINIC | Age: 30
End: 2025-04-10
Payer: COMMERCIAL

## 2025-04-12 ENCOUNTER — HOME CARE VISIT (OUTPATIENT)
Dept: HOME HEALTH SERVICES | Facility: HOME HEALTH | Age: 30
End: 2025-04-12
Payer: COMMERCIAL

## 2025-04-15 ENCOUNTER — APPOINTMENT (OUTPATIENT)
Dept: WOUND CARE | Facility: CLINIC | Age: 30
End: 2025-04-15
Payer: COMMERCIAL

## 2025-04-17 ENCOUNTER — OFFICE VISIT (OUTPATIENT)
Dept: WOUND CARE | Facility: CLINIC | Age: 30
End: 2025-04-17
Payer: COMMERCIAL

## 2025-04-17 PROCEDURE — 11043 DBRDMT MUSC&/FSCA 1ST 20/<: CPT

## 2025-04-18 ENCOUNTER — HOME CARE VISIT (OUTPATIENT)
Dept: HOME HEALTH SERVICES | Facility: HOME HEALTH | Age: 30
End: 2025-04-18
Payer: COMMERCIAL

## 2025-04-22 ENCOUNTER — HOME CARE VISIT (OUTPATIENT)
Dept: HOME HEALTH SERVICES | Facility: HOME HEALTH | Age: 30
End: 2025-04-22
Payer: COMMERCIAL

## 2025-04-24 ENCOUNTER — APPOINTMENT (OUTPATIENT)
Dept: WOUND CARE | Facility: CLINIC | Age: 30
End: 2025-04-24
Payer: COMMERCIAL

## 2025-04-29 ENCOUNTER — OFFICE VISIT (OUTPATIENT)
Dept: WOUND CARE | Facility: CLINIC | Age: 30
End: 2025-04-29
Payer: COMMERCIAL

## 2025-04-29 ENCOUNTER — HOME CARE VISIT (OUTPATIENT)
Dept: HOME HEALTH SERVICES | Facility: HOME HEALTH | Age: 30
End: 2025-04-29
Payer: COMMERCIAL

## 2025-04-29 PROCEDURE — 11042 DBRDMT SUBQ TIS 1ST 20SQCM/<: CPT

## 2025-05-06 ENCOUNTER — APPOINTMENT (OUTPATIENT)
Dept: WOUND CARE | Facility: CLINIC | Age: 30
End: 2025-05-06
Payer: COMMERCIAL

## 2025-05-07 ENCOUNTER — HOME CARE VISIT (OUTPATIENT)
Dept: HOME HEALTH SERVICES | Facility: HOME HEALTH | Age: 30
End: 2025-05-07
Payer: COMMERCIAL

## 2025-05-08 ASSESSMENT — ACTIVITIES OF DAILY LIVING (ADL)
OASIS_M1830: 03
HOME_HEALTH_OASIS: 01

## 2025-05-13 ENCOUNTER — APPOINTMENT (OUTPATIENT)
Dept: WOUND CARE | Facility: CLINIC | Age: 30
End: 2025-05-13
Payer: COMMERCIAL

## 2025-05-16 ENCOUNTER — OFFICE VISIT (OUTPATIENT)
Dept: WOUND CARE | Facility: CLINIC | Age: 30
End: 2025-05-16
Payer: COMMERCIAL

## 2025-05-16 PROCEDURE — 11042 DBRDMT SUBQ TIS 1ST 20SQCM/<: CPT

## 2025-05-22 ENCOUNTER — APPOINTMENT (OUTPATIENT)
Dept: WOUND CARE | Facility: CLINIC | Age: 30
End: 2025-05-22
Payer: COMMERCIAL

## 2025-05-27 ENCOUNTER — APPOINTMENT (OUTPATIENT)
Dept: WOUND CARE | Facility: CLINIC | Age: 30
End: 2025-05-27
Payer: COMMERCIAL

## 2025-05-29 ENCOUNTER — OFFICE VISIT (OUTPATIENT)
Dept: WOUND CARE | Facility: CLINIC | Age: 30
End: 2025-05-29
Payer: COMMERCIAL

## 2025-05-29 PROCEDURE — 11043 DBRDMT MUSC&/FSCA 1ST 20/<: CPT

## 2025-06-05 ENCOUNTER — OFFICE VISIT (OUTPATIENT)
Dept: WOUND CARE | Facility: CLINIC | Age: 30
End: 2025-06-05
Payer: COMMERCIAL

## 2025-06-05 PROCEDURE — 11043 DBRDMT MUSC&/FSCA 1ST 20/<: CPT

## 2025-06-05 PROCEDURE — 11042 DBRDMT SUBQ TIS 1ST 20SQCM/<: CPT | Mod: XU

## 2025-06-12 ENCOUNTER — OFFICE VISIT (OUTPATIENT)
Dept: WOUND CARE | Facility: CLINIC | Age: 30
End: 2025-06-12
Payer: COMMERCIAL

## 2025-06-12 PROCEDURE — 11042 DBRDMT SUBQ TIS 1ST 20SQCM/<: CPT

## 2025-06-25 ENCOUNTER — APPOINTMENT (OUTPATIENT)
Dept: WOUND CARE | Facility: CLINIC | Age: 30
End: 2025-06-25
Payer: COMMERCIAL

## 2025-06-26 ENCOUNTER — OFFICE VISIT (OUTPATIENT)
Dept: WOUND CARE | Facility: CLINIC | Age: 30
End: 2025-06-26
Payer: COMMERCIAL

## 2025-06-26 PROCEDURE — 11042 DBRDMT SUBQ TIS 1ST 20SQCM/<: CPT | Mod: 59

## 2025-06-26 PROCEDURE — 11043 DBRDMT MUSC&/FSCA 1ST 20/<: CPT

## 2025-07-03 ENCOUNTER — OFFICE VISIT (OUTPATIENT)
Dept: WOUND CARE | Facility: CLINIC | Age: 30
End: 2025-07-03
Payer: COMMERCIAL

## 2025-07-03 PROCEDURE — 11043 DBRDMT MUSC&/FSCA 1ST 20/<: CPT

## 2025-07-09 ENCOUNTER — OFFICE VISIT (OUTPATIENT)
Dept: WOUND CARE | Facility: CLINIC | Age: 30
End: 2025-07-09
Payer: COMMERCIAL

## 2025-07-09 PROCEDURE — 11042 DBRDMT SUBQ TIS 1ST 20SQCM/<: CPT

## 2025-07-16 ENCOUNTER — APPOINTMENT (OUTPATIENT)
Dept: WOUND CARE | Facility: CLINIC | Age: 30
End: 2025-07-16
Payer: COMMERCIAL

## 2025-07-23 ENCOUNTER — APPOINTMENT (OUTPATIENT)
Dept: WOUND CARE | Facility: CLINIC | Age: 30
End: 2025-07-23
Payer: COMMERCIAL

## 2025-07-25 ENCOUNTER — APPOINTMENT (OUTPATIENT)
Dept: WOUND CARE | Facility: CLINIC | Age: 30
End: 2025-07-25
Payer: COMMERCIAL

## 2025-07-29 ENCOUNTER — APPOINTMENT (OUTPATIENT)
Dept: PRIMARY CARE | Facility: CLINIC | Age: 30
End: 2025-07-29
Payer: COMMERCIAL

## 2025-07-29 ENCOUNTER — OFFICE VISIT (OUTPATIENT)
Dept: WOUND CARE | Facility: CLINIC | Age: 30
End: 2025-07-29
Payer: COMMERCIAL

## 2025-07-29 PROCEDURE — 11042 DBRDMT SUBQ TIS 1ST 20SQCM/<: CPT

## 2025-08-05 ENCOUNTER — APPOINTMENT (OUTPATIENT)
Dept: WOUND CARE | Facility: CLINIC | Age: 30
End: 2025-08-05
Payer: COMMERCIAL

## 2025-08-06 ENCOUNTER — APPOINTMENT (OUTPATIENT)
Dept: WOUND CARE | Facility: CLINIC | Age: 30
End: 2025-08-06
Payer: COMMERCIAL

## 2025-08-12 ENCOUNTER — APPOINTMENT (OUTPATIENT)
Dept: WOUND CARE | Facility: CLINIC | Age: 30
End: 2025-08-12
Payer: COMMERCIAL

## 2025-08-27 ENCOUNTER — APPOINTMENT (OUTPATIENT)
Dept: WOUND CARE | Facility: CLINIC | Age: 30
End: 2025-08-27
Payer: COMMERCIAL

## 2025-08-28 ENCOUNTER — APPOINTMENT (OUTPATIENT)
Dept: WOUND CARE | Facility: CLINIC | Age: 30
End: 2025-08-28
Payer: COMMERCIAL

## 2025-09-23 ENCOUNTER — APPOINTMENT (OUTPATIENT)
Dept: PRIMARY CARE | Facility: CLINIC | Age: 30
End: 2025-09-23
Payer: COMMERCIAL